# Patient Record
Sex: MALE | Race: OTHER | HISPANIC OR LATINO | URBAN - METROPOLITAN AREA
[De-identification: names, ages, dates, MRNs, and addresses within clinical notes are randomized per-mention and may not be internally consistent; named-entity substitution may affect disease eponyms.]

---

## 2019-01-02 ENCOUNTER — EMERGENCY (EMERGENCY)
Facility: HOSPITAL | Age: 64
LOS: 1 days | Discharge: HOME | End: 2019-01-02
Attending: EMERGENCY MEDICINE
Payer: SELF-PAY

## 2019-01-02 VITALS
HEART RATE: 99 BPM | OXYGEN SATURATION: 96 % | RESPIRATION RATE: 19 BRPM | WEIGHT: 189.6 LBS | TEMPERATURE: 99 F | HEIGHT: 68.11 IN | SYSTOLIC BLOOD PRESSURE: 141 MMHG | DIASTOLIC BLOOD PRESSURE: 95 MMHG

## 2019-01-02 PROCEDURE — 90715 TDAP VACCINE 7 YRS/> IM: CPT

## 2019-01-02 PROCEDURE — 99283 EMERGENCY DEPT VISIT LOW MDM: CPT | Mod: 25

## 2019-01-02 PROCEDURE — 90471 IMMUNIZATION ADMIN: CPT

## 2019-01-02 PROCEDURE — 12002 RPR S/N/AX/GEN/TRNK2.6-7.5CM: CPT | Mod: LT

## 2019-01-02 PROCEDURE — 12002 RPR S/N/AX/GEN/TRNK2.6-7.5CM: CPT

## 2019-01-02 PROCEDURE — 73562 X-RAY EXAM OF KNEE 3: CPT

## 2019-01-02 PROCEDURE — 73562 X-RAY EXAM OF KNEE 3: CPT | Mod: 26,LT

## 2019-01-02 RX ORDER — ACETAMINOPHEN 500 MG
975 TABLET ORAL ONCE
Qty: 0 | Refills: 0 | Status: COMPLETED | OUTPATIENT
Start: 2019-01-02 | End: 2019-01-02

## 2019-01-02 RX ORDER — TETANUS TOXOID, REDUCED DIPHTHERIA TOXOID AND ACELLULAR PERTUSSIS VACCINE, ADSORBED 5; 2.5; 8; 8; 2.5 [IU]/.5ML; [IU]/.5ML; UG/.5ML; UG/.5ML; UG/.5ML
0.5 SUSPENSION INTRAMUSCULAR ONCE
Qty: 0 | Refills: 0 | Status: COMPLETED | OUTPATIENT
Start: 2019-01-02 | End: 2019-01-02

## 2019-01-02 RX ADMIN — Medication 975 MILLIGRAM(S): at 11:50

## 2019-01-02 RX ADMIN — TETANUS TOXOID, REDUCED DIPHTHERIA TOXOID AND ACELLULAR PERTUSSIS VACCINE, ADSORBED 0.5 MILLILITER(S): 5; 2.5; 8; 8; 2.5 SUSPENSION INTRAMUSCULAR at 12:01

## 2019-01-02 NOTE — ED PROVIDER NOTE - MEDICAL DECISION MAKING DETAILS
Attending note-2 lacerations to the left lateral knee and one laceration to left wrist. X-rays rule out foreign body this patient was caught with shattered glass. Repair. Tetanus prophylaxis. Analgesia.

## 2019-01-02 NOTE — ED PROVIDER NOTE - ATTENDING CONTRIBUTION TO CARE
I performed a history and physical exam of the patient and discussed their management with the resident/ACP. I reviewed the resident/ACP's note and agree with the documented findings and plan of care.  attn  see MDM

## 2019-01-02 NOTE — ED PROVIDER NOTE - PHYSICAL EXAMINATION
Attending note. Patient is alert and in no acute distress. Patient has a 5 cm jagged laceration to the lateral aspect of the left knee and a 3 cm laceration in the lateral aspect of the left knee. Patient has a 1 cm laceration on the radial side of the left wrist. Distal pulses are intact. Sensation is intact. There are no visible foreign bodies.

## 2019-01-02 NOTE — ED PROVIDER NOTE - NSFOLLOWUPINSTRUCTIONS_ED_ALL_ED_FT
You were seen in the Emergency Department for lacerations. They were repaired. Keep the site dry for 24 hours. Please follow up with any physician in 7 days for suture removal. Apply bacitracin to the site daily. Please return to the Emergency Department if you have any new concerning symptoms such as severe pain, weakness, or any other concerning symptoms.

## 2019-01-02 NOTE — ED PROVIDER NOTE - PROGRESS NOTE DETAILS
Patient's wife who reports that the patient does not have any allergies. Patient is diabetes on insulin. Patient received tetanus immunization for years ago, however tetanus prophylaxis was given.

## 2019-01-02 NOTE — ED ADULT NURSE NOTE - OBJECTIVE STATEMENT
63 yr old male a/oX 3 c/o large glass window pane falling on him causing laceration to left knee above patella and lateral side of left thigh.  No head trauma or loc.  Normal ROm and neurovascular status

## 2019-01-02 NOTE — ED PROVIDER NOTE - CARE PLAN
Principal Discharge DX:	Laceration of left knee, initial encounter  Secondary Diagnosis:	Laceration of left wrist, initial encounter

## 2019-01-02 NOTE — ED PROVIDER NOTE - OBJECTIVE STATEMENT
Attending note. Patient was seen in the fast track room #4. Patient states last fell and shattered causing laceration to the left lateral knee and left wrist. Patient denies any numbness or paresthesia. Patient is not up-to-date with his tetanus.

## 2019-01-10 ENCOUNTER — EMERGENCY (EMERGENCY)
Facility: HOSPITAL | Age: 64
LOS: 1 days | Discharge: HOME | End: 2019-01-10
Attending: EMERGENCY MEDICINE
Payer: SELF-PAY

## 2019-01-10 VITALS
HEART RATE: 73 BPM | WEIGHT: 199.96 LBS | OXYGEN SATURATION: 98 % | RESPIRATION RATE: 16 BRPM | TEMPERATURE: 98 F | SYSTOLIC BLOOD PRESSURE: 128 MMHG | DIASTOLIC BLOOD PRESSURE: 81 MMHG

## 2019-01-10 DIAGNOSIS — S71.112A LACERATION WITHOUT FOREIGN BODY, LEFT THIGH, INITIAL ENCOUNTER: ICD-10-CM

## 2019-01-10 PROCEDURE — G0463: CPT

## 2019-01-10 NOTE — ED PROCEDURE NOTE - ATTENDING CONTRIBUTION TO CARE
I have participated in and supervised all key portions of the above procedures and agree with the above documentation. AJAY Moran MD

## 2019-01-10 NOTE — ED PROVIDER NOTE - OBJECTIVE STATEMENT
63M hx of DM presents to the ED for suture removal. Had a left distal thigh laceration repaired on 1/2/19. States the wound has been healing well. Denies any pus drainage, swelling, spreading redness, numbness, tingling, or weakness. Did received tetanus vaccination. Denies receiving any prophylactic abx.

## 2019-01-10 NOTE — ED PROVIDER NOTE - NSFOLLOWUPINSTRUCTIONS_ED_ALL_ED_FT
You were seen in the Emergency Department for removal of sutures (stitches) from your right knee.    Follow up with your primary care doctor within the next 2-3 days.  Return immediately if you have worsening pain, redness, swelling, pus draining from wound, fever or other new/concerning symptoms.

## 2019-01-10 NOTE — ED PROVIDER NOTE - ATTENDING CONTRIBUTION TO CARE
suture removal s/p laceration repair; 9 sutures removed from anterior left knee; no significant erythema, no palpable warmth/fluctuance over area.  Stable for dc after suture removal with return precautions for redness, swelling, pus draining from wound, fever or other new/concerning symptoms.

## 2019-01-10 NOTE — ED PROVIDER NOTE - MEDICAL DECISION MAKING DETAILS
Well-appearing suture repair. No signs of infection. Will remove sutures and have pt f/u with his PCP.

## 2019-01-10 NOTE — ED PROVIDER NOTE - NS ED ROS FT
GENERAL: No fever or chills  EYES: no change in vision  HEENT: no trouble swallowing or speaking  CARDIAC: no chest pain  PULMONARY: no cough or SOB  GI: no abdominal pain, no nausea, no vomiting, no diarrhea or constipation  : No changes in urination  SKIN: +L thigh laceration  NEURO: no headache or neuro sxs  MSK: No joint pain     ~Berhane Valdovinos PGY1

## 2019-01-10 NOTE — ED PROVIDER NOTE - PHYSICAL EXAMINATION
Gen: AAOx3, non-toxic  Head: NCAT  HEENT: EOMI, oral mucosa moist, normal conjunctiva  Lung: CTAB, no respiratory distress, no wheezes/rhonchi/rales B/L, speaking in full sentences  CV: RRR, no murmurs, rubs or gallops  Abd: soft, NTND, no guarding, no CVA tenderness  MSK: no visible deformities  Neuro: No focal sensory or motor deficits  Skin: +well healing L distal thigh laceration with 9 simple interrupted sutures. No signes of infection/fluid collection/cellulitis  Psych: normal affect.     ~Berhane Valdovinos PGY1

## 2019-01-16 ENCOUNTER — EMERGENCY (EMERGENCY)
Facility: HOSPITAL | Age: 64
LOS: 1 days | Discharge: HOME | End: 2019-01-16
Attending: EMERGENCY MEDICINE
Payer: SELF-PAY

## 2019-01-16 VITALS
WEIGHT: 199.96 LBS | RESPIRATION RATE: 18 BRPM | HEIGHT: 71 IN | SYSTOLIC BLOOD PRESSURE: 120 MMHG | DIASTOLIC BLOOD PRESSURE: 83 MMHG | HEART RATE: 71 BPM | OXYGEN SATURATION: 98 % | TEMPERATURE: 99 F

## 2019-01-16 VITALS
SYSTOLIC BLOOD PRESSURE: 115 MMHG | HEART RATE: 74 BPM | RESPIRATION RATE: 19 BRPM | OXYGEN SATURATION: 97 % | DIASTOLIC BLOOD PRESSURE: 71 MMHG

## 2019-01-16 PROBLEM — E11.9 TYPE 2 DIABETES MELLITUS WITHOUT COMPLICATIONS: Chronic | Status: ACTIVE | Noted: 2019-01-10

## 2019-01-16 LAB
ALBUMIN SERPL ELPH-MCNC: 4.2 G/DL — SIGNIFICANT CHANGE UP (ref 3.3–5)
ALP SERPL-CCNC: 82 U/L — SIGNIFICANT CHANGE UP (ref 40–120)
ALT FLD-CCNC: 38 U/L — SIGNIFICANT CHANGE UP (ref 10–45)
ANION GAP SERPL CALC-SCNC: 10 MMOL/L — SIGNIFICANT CHANGE UP (ref 5–17)
AST SERPL-CCNC: 25 U/L — SIGNIFICANT CHANGE UP (ref 10–40)
BASOPHILS # BLD AUTO: 0.1 K/UL — SIGNIFICANT CHANGE UP (ref 0–0.2)
BASOPHILS NFR BLD AUTO: 0.8 % — SIGNIFICANT CHANGE UP (ref 0–2)
BILIRUB SERPL-MCNC: 0.4 MG/DL — SIGNIFICANT CHANGE UP (ref 0.2–1.2)
BUN SERPL-MCNC: 13 MG/DL — SIGNIFICANT CHANGE UP (ref 7–23)
CALCIUM SERPL-MCNC: 9.3 MG/DL — SIGNIFICANT CHANGE UP (ref 8.4–10.5)
CHLORIDE SERPL-SCNC: 100 MMOL/L — SIGNIFICANT CHANGE UP (ref 96–108)
CO2 SERPL-SCNC: 25 MMOL/L — SIGNIFICANT CHANGE UP (ref 22–31)
CREAT SERPL-MCNC: 0.58 MG/DL — SIGNIFICANT CHANGE UP (ref 0.5–1.3)
EOSINOPHIL # BLD AUTO: 0.3 K/UL — SIGNIFICANT CHANGE UP (ref 0–0.5)
EOSINOPHIL NFR BLD AUTO: 4.5 % — SIGNIFICANT CHANGE UP (ref 0–6)
GLUCOSE SERPL-MCNC: 235 MG/DL — HIGH (ref 70–99)
HCT VFR BLD CALC: 41.3 % — SIGNIFICANT CHANGE UP (ref 39–50)
HGB BLD-MCNC: 14.6 G/DL — SIGNIFICANT CHANGE UP (ref 13–17)
LYMPHOCYTES # BLD AUTO: 2.3 K/UL — SIGNIFICANT CHANGE UP (ref 1–3.3)
LYMPHOCYTES # BLD AUTO: 29.5 % — SIGNIFICANT CHANGE UP (ref 13–44)
MCHC RBC-ENTMCNC: 31.5 PG — SIGNIFICANT CHANGE UP (ref 27–34)
MCHC RBC-ENTMCNC: 35.4 GM/DL — SIGNIFICANT CHANGE UP (ref 32–36)
MCV RBC AUTO: 89.1 FL — SIGNIFICANT CHANGE UP (ref 80–100)
MONOCYTES # BLD AUTO: 0.6 K/UL — SIGNIFICANT CHANGE UP (ref 0–0.9)
MONOCYTES NFR BLD AUTO: 7.2 % — SIGNIFICANT CHANGE UP (ref 2–14)
NEUTROPHILS # BLD AUTO: 4.5 K/UL — SIGNIFICANT CHANGE UP (ref 1.8–7.4)
NEUTROPHILS NFR BLD AUTO: 58.1 % — SIGNIFICANT CHANGE UP (ref 43–77)
PLATELET # BLD AUTO: 280 K/UL — SIGNIFICANT CHANGE UP (ref 150–400)
POTASSIUM SERPL-MCNC: 3.7 MMOL/L — SIGNIFICANT CHANGE UP (ref 3.5–5.3)
POTASSIUM SERPL-SCNC: 3.7 MMOL/L — SIGNIFICANT CHANGE UP (ref 3.5–5.3)
PROT SERPL-MCNC: 7.6 G/DL — SIGNIFICANT CHANGE UP (ref 6–8.3)
RBC # BLD: 4.64 M/UL — SIGNIFICANT CHANGE UP (ref 4.2–5.8)
RBC # FLD: 11.7 % — SIGNIFICANT CHANGE UP (ref 10.3–14.5)
SODIUM SERPL-SCNC: 135 MMOL/L — SIGNIFICANT CHANGE UP (ref 135–145)
WBC # BLD: 7.8 K/UL — SIGNIFICANT CHANGE UP (ref 3.8–10.5)
WBC # FLD AUTO: 7.8 K/UL — SIGNIFICANT CHANGE UP (ref 3.8–10.5)

## 2019-01-16 PROCEDURE — 76536 US EXAM OF HEAD AND NECK: CPT

## 2019-01-16 PROCEDURE — 80053 COMPREHEN METABOLIC PANEL: CPT

## 2019-01-16 PROCEDURE — 99284 EMERGENCY DEPT VISIT MOD MDM: CPT

## 2019-01-16 PROCEDURE — 87070 CULTURE OTHR SPECIMN AEROBIC: CPT

## 2019-01-16 PROCEDURE — 85027 COMPLETE CBC AUTOMATED: CPT

## 2019-01-16 RX ORDER — CEPHALEXIN 500 MG
1 CAPSULE ORAL
Qty: 10 | Refills: 0 | OUTPATIENT
Start: 2019-01-16 | End: 2019-01-20

## 2019-01-16 NOTE — ED ADULT NURSE NOTE - OBJECTIVE STATEMENT
64yo Male a/ox3 ambulatory with PMH of DM presenting to ed with c/o drainage from left knee/thigh wound. Pt mostly Afghan speaking, prefers wife at bedside to assist with translation. Pt seen in [facility] ED 1/2/19 after having a fall on glass with L distal thigh laceration, repaired with sutures. Xray negative for FB at time, and tdap given. Pt returned for suture removal and wound check on 1/10/19, all WNL. Pt reports persistent watery, clear/white drainage from wound since. Did not take any antibiotics. Reports knee pain has been the same since fall. Denies any fever/chills, n/v, purulent drainage, spreading redness, increase in knee pain/swelling.

## 2019-01-16 NOTE — ED PROVIDER NOTE - PLAN OF CARE
Stay hydrated.   Take keflex 500mg twice daily for 5 days.  Take Tylenol 650mg every 6 hours as needed for pain.   Please follow up with your Primary Care Provider upon discharge as instructed.   Bring copy of results with you.   Return to ER for any new fever/chills, nausea, vomiting, worsening knee or wound pain/swelling/redness, purulent drainage, or any other concerns.

## 2019-01-16 NOTE — ED PROVIDER NOTE - ATTENDING CONTRIBUTION TO CARE
Patient is a 62 yo M with history of DM s/p fall on 1/2/19, injuring his left distal thigh, s/p suture placement, tetanus update, suture removal - now here for evaluation for clear drainage from wound. Sutures were removed 10th. No fevers, nausea, vomiting. He states pain has been the same since the fall. He is ambulating.     VS noted  Gen. no acute distress, Non toxic   HEENT: EOMI, mmm  Lungs: CTAB/L no C/ W /R   CVS: RRR   Abd; Soft non tender, non distended   Ext:   Skin: no rash  Neuro AAOx3 non focal clear speech  a/p:  left leg wound  - Carlos MITCHELL Patient is a 64 yo M with history of DM s/p fall on 1/2/19, injuring his left distal thigh, s/p suture placement, tetanus update, suture removal - now here for evaluation for clear drainage from wound. Sutures were removed 10th. No fevers, nausea, vomiting. He states pain has been the same since the fall. He is ambulating.     VS noted  Gen. no acute distress, Non toxic   HEENT: EOMI, mmm  Lungs: CTAB/L no C/ W /R   CVS: RRR   Abd; Soft non tender, non distended   Ext: left distal femur with healing wound, + min serosanguinous drainage, minimal erythema, no warmth to touch, no tenderness or crepitus around wound site  Skin: no rash  Neuro AAOx3 non focal clear speech  a/p:  left leg wound with mild serosanguinous drainage - will do ED US to look for collection/ drainable abscess and likely d/c with antibiotics and instructions for close follow up. Return for any fever, increasing pain, worsening drainage/ pus/ increased redness or warmth to the site.   - Carlos MITCHELL

## 2019-01-16 NOTE — ED PROVIDER NOTE - NSFOLLOWUPINSTRUCTIONS_ED_ALL_ED_FT
Stay hydrated.   Take antibiotic, keflex 500mg, twice daily for 5 days.  Take Tylenol 650mg every 6 hours as needed for pain.   Please follow up with your Primary Care Provider upon discharge as instructed.   Bring copy of results with you.   Return to ER for any new fever/chills, nausea, vomiting, worsening knee or wound pain/swelling/redness, purulent drainage, or any other concerns.

## 2019-01-16 NOTE — ED ADULT NURSE NOTE - NSIMPLEMENTINTERV_GEN_ALL_ED
Implemented All Universal Safety Interventions:  [place] to call system. Call bell, personal items and telephone within reach. Instruct patient to call for assistance. Room bathroom lighting operational. Non-slip footwear when patient is off stretcher. Physically safe environment: no spills, clutter or unnecessary equipment. Stretcher in lowest position, wheels locked, appropriate side rails in place.

## 2019-01-16 NOTE — ED PROVIDER NOTE - PROGRESS NOTE DETAILS
Scant fluid collection noted on US (0.25cm) that was extracted with minimal pressure, again + clear drainage. Pt's wife concerned for infection given pt is a diabetic, though pt is afebrile with normal WBC and nonpurulent drainage with NO surrounding cellulitis. Will send rx for keflex and instructed patient to f/u with PCP. D/w Dr. Gonzalez. - RANJIT NazarioC

## 2019-01-16 NOTE — ED PROVIDER NOTE - LOCATION
+ 4cm healing wound to lateral distal L thigh/superior knee with NO obvious dehiscence, + minimal clear drainage, + mild erythema along wound with no spreading redness/streaking, no warmth, no fluctuance noted, full ROM of knee intact

## 2019-01-16 NOTE — ED PROVIDER NOTE - CARE PLAN
Principal Discharge DX:	Wound of lower extremity, subsequent encounter  Assessment and plan of treatment:	Stay hydrated.   Take keflex 500mg twice daily for 5 days.  Take Tylenol 650mg every 6 hours as needed for pain.   Please follow up with your Primary Care Provider upon discharge as instructed.   Bring copy of results with you.   Return to ER for any new fever/chills, nausea, vomiting, worsening knee or wound pain/swelling/redness, purulent drainage, or any other concerns.

## 2019-01-16 NOTE — ED ADULT TRIAGE NOTE - CHIEF COMPLAINT QUOTE
sustained wound 1/2/19 seen in [facility] ED stitches placed, seen again 1/10/19 stitches removed now c/o "drainage" to site - drainage clear/bloody

## 2019-01-16 NOTE — ED ADULT NURSE NOTE - CHIEF COMPLAINT QUOTE
sustained wound 1/2/19 seen in [facility] ED stitches placed, seen again 1/10/19 stitches removed now c/o "drainage" to site - drainage clear/bloody

## 2019-01-16 NOTE — ED PROVIDER NOTE - OBJECTIVE STATEMENT
64yo M with PMH DM c/o drainage from left knee/thigh wound. Pt mostly Setswana speaking, prefers wife at bedside to assist with translation. Pt seen in [facility] ED 1/2/19 for fall on glass with L distal thigh laceration, repaired with sutures. Pt returned for suture removal and wound check on 1/10/19, all WNL. Pt reports persistent watery, clear/white drainage from wound since. Did not take any antibiotics. Reports knee pain has been the same since fall. Denies any fever/chills, n/v, purulent drainage, spreading redness, increase in knee pain/swelling. 62yo M with PMH DM c/o drainage from left knee/thigh wound. Pt mostly Latvian speaking, prefers wife at bedside to assist with translation. Pt seen in [facility] ED 1/2/19 for fall on glass with L distal thigh laceration, repaired with sutures. Xray negative for FB at time, and tdap given. Pt returned for suture removal and wound check on 1/10/19, all WNL. Pt reports persistent watery, clear/white drainage from wound since. Did not take any antibiotics. Reports knee pain has been the same since fall. Denies any fever/chills, n/v, purulent drainage, spreading redness, increase in knee pain/swelling. 62yo M with PMH DM c/o drainage from left knee/thigh wound. Pt mostly Mongolian speaking, prefers wife at bedside to assist with translation. Pt seen in [facility] ED 1/2/19 for fall on glass with L distal thigh laceration, repaired with sutures. Xray negative for FB at time, and tdap given. Pt returned for suture removal and wound check on 1/10/19, all WNL. Pt reports persistent watery, clear/white drainage from wound since. Did not take any antibiotics. Reports knee pain has been the same since fall. Denies any fever/chills, n/v, purulent drainage, spreading redness, increase in knee pain/swelling, numbness/tingling. 64yo M with PMH DM c/o drainage from left knee/thigh wound. Pt mostly Lao speaking, prefers wife at bedside to assist with translation. Pt seen in [facility] ED 1/2/19 for fall on glass with L distal thigh laceration, repaired with sutures. Xray negative for FB at time, and tdap given. Pt returned for suture removal and wound check on 1/10/19, all WNL. Pt reports persistent watery, clear/white drainage from wound since. Did not take any antibiotics. Reports knee pain has been the same since fall. Able to ambulate without trouble. Denies any fever/chills, n/v, purulent drainage, spreading redness, increase in knee pain/swelling, numbness/tingling.

## 2019-01-17 PROCEDURE — 76536 US EXAM OF HEAD AND NECK: CPT | Mod: 26

## 2019-01-18 LAB
CULTURE RESULTS: SIGNIFICANT CHANGE UP
SPECIMEN SOURCE: SIGNIFICANT CHANGE UP

## 2019-06-03 NOTE — ED PROVIDER NOTE - CARDIAC, MLM
pressures at home. Return in about 3 weeks (around 6/10/2019) for wellness. Reviewed with the patient: current clinical status, medications, activities and diet. Side effects, adverse effects of the medication prescribed today, as well as treatment plan/ rationale and result expectations have beendiscussed with the patient who expresses understanding and desires to proceed. Close follow up to evaluate treatment results and for coordinationof care. I have reviewed the patient's medical history in detail and updated the computerized patient record.     TANGELA Jessica - CNP Normal rate, regular rhythm.  Heart sounds S1, S2.  No murmurs, rubs or gallops.

## 2023-10-20 ENCOUNTER — EMERGENCY (EMERGENCY)
Facility: HOSPITAL | Age: 68
LOS: 1 days | Discharge: HOME | End: 2023-10-20
Attending: EMERGENCY MEDICINE
Payer: COMMERCIAL

## 2023-10-20 VITALS
HEART RATE: 61 BPM | TEMPERATURE: 98 F | OXYGEN SATURATION: 97 % | DIASTOLIC BLOOD PRESSURE: 80 MMHG | SYSTOLIC BLOOD PRESSURE: 149 MMHG | RESPIRATION RATE: 18 BRPM

## 2023-10-20 VITALS
SYSTOLIC BLOOD PRESSURE: 148 MMHG | RESPIRATION RATE: 20 BRPM | DIASTOLIC BLOOD PRESSURE: 77 MMHG | WEIGHT: 211.86 LBS | HEIGHT: 71 IN | OXYGEN SATURATION: 99 % | TEMPERATURE: 98 F | HEART RATE: 66 BPM

## 2023-10-20 LAB
ALBUMIN SERPL ELPH-MCNC: 4.5 G/DL — SIGNIFICANT CHANGE UP (ref 3.5–5)
ALP SERPL-CCNC: 99 U/L — SIGNIFICANT CHANGE UP (ref 40–120)
ALT FLD-CCNC: 26 U/L DA — SIGNIFICANT CHANGE UP (ref 10–60)
ANION GAP SERPL CALC-SCNC: 8 MMOL/L — SIGNIFICANT CHANGE UP (ref 5–17)
AST SERPL-CCNC: 16 U/L — SIGNIFICANT CHANGE UP (ref 10–40)
BILIRUB SERPL-MCNC: 0.4 MG/DL — SIGNIFICANT CHANGE UP (ref 0.2–1.2)
BUN SERPL-MCNC: 23 MG/DL — HIGH (ref 7–18)
CALCIUM SERPL-MCNC: 9.7 MG/DL — SIGNIFICANT CHANGE UP (ref 8.4–10.5)
CHLORIDE SERPL-SCNC: 105 MMOL/L — SIGNIFICANT CHANGE UP (ref 96–108)
CO2 SERPL-SCNC: 26 MMOL/L — SIGNIFICANT CHANGE UP (ref 22–31)
CREAT SERPL-MCNC: 1.22 MG/DL — SIGNIFICANT CHANGE UP (ref 0.5–1.3)
CRP SERPL-MCNC: 12 MG/L — HIGH
EGFR: 64 ML/MIN/1.73M2 — SIGNIFICANT CHANGE UP
ERYTHROCYTE [SEDIMENTATION RATE] IN BLOOD: 18 MM/HR — SIGNIFICANT CHANGE UP (ref 0–20)
GLUCOSE SERPL-MCNC: 79 MG/DL — SIGNIFICANT CHANGE UP (ref 70–99)
HCT VFR BLD CALC: 38.2 % — LOW (ref 39–50)
HGB BLD-MCNC: 12.4 G/DL — LOW (ref 13–17)
MCHC RBC-ENTMCNC: 30.4 PG — SIGNIFICANT CHANGE UP (ref 27–34)
MCHC RBC-ENTMCNC: 32.5 GM/DL — SIGNIFICANT CHANGE UP (ref 32–36)
MCV RBC AUTO: 93.6 FL — SIGNIFICANT CHANGE UP (ref 80–100)
NRBC # BLD: 0 /100 WBCS — SIGNIFICANT CHANGE UP (ref 0–0)
PLATELET # BLD AUTO: 251 K/UL — SIGNIFICANT CHANGE UP (ref 150–400)
POTASSIUM SERPL-MCNC: 4 MMOL/L — SIGNIFICANT CHANGE UP (ref 3.5–5.3)
POTASSIUM SERPL-SCNC: 4 MMOL/L — SIGNIFICANT CHANGE UP (ref 3.5–5.3)
PROT SERPL-MCNC: 10 G/DL — HIGH (ref 6–8.3)
RBC # BLD: 4.08 M/UL — LOW (ref 4.2–5.8)
RBC # FLD: 13.4 % — SIGNIFICANT CHANGE UP (ref 10.3–14.5)
SODIUM SERPL-SCNC: 139 MMOL/L — SIGNIFICANT CHANGE UP (ref 135–145)
WBC # BLD: 7.58 K/UL — SIGNIFICANT CHANGE UP (ref 3.8–10.5)
WBC # FLD AUTO: 7.58 K/UL — SIGNIFICANT CHANGE UP (ref 3.8–10.5)

## 2023-10-20 PROCEDURE — 87077 CULTURE AEROBIC IDENTIFY: CPT

## 2023-10-20 PROCEDURE — 73620 X-RAY EXAM OF FOOT: CPT

## 2023-10-20 PROCEDURE — 99284 EMERGENCY DEPT VISIT MOD MDM: CPT | Mod: 25

## 2023-10-20 PROCEDURE — 87070 CULTURE OTHR SPECIMN AEROBIC: CPT

## 2023-10-20 PROCEDURE — 83036 HEMOGLOBIN GLYCOSYLATED A1C: CPT

## 2023-10-20 PROCEDURE — 86140 C-REACTIVE PROTEIN: CPT

## 2023-10-20 PROCEDURE — 85652 RBC SED RATE AUTOMATED: CPT

## 2023-10-20 PROCEDURE — 36415 COLL VENOUS BLD VENIPUNCTURE: CPT

## 2023-10-20 PROCEDURE — 80053 COMPREHEN METABOLIC PANEL: CPT

## 2023-10-20 PROCEDURE — 87186 SC STD MICRODIL/AGAR DIL: CPT

## 2023-10-20 PROCEDURE — 10160 PNXR ASPIR ABSC HMTMA BULLA: CPT

## 2023-10-20 PROCEDURE — 73620 X-RAY EXAM OF FOOT: CPT | Mod: 26,RT

## 2023-10-20 PROCEDURE — 85027 COMPLETE CBC AUTOMATED: CPT

## 2023-10-20 PROCEDURE — 87040 BLOOD CULTURE FOR BACTERIA: CPT

## 2023-10-20 PROCEDURE — 99285 EMERGENCY DEPT VISIT HI MDM: CPT

## 2023-10-20 RX ORDER — CEPHALEXIN 500 MG
500 CAPSULE ORAL ONCE
Refills: 0 | Status: COMPLETED | OUTPATIENT
Start: 2023-10-20 | End: 2023-10-20

## 2023-10-20 RX ORDER — CEPHALEXIN 500 MG
1 CAPSULE ORAL
Qty: 14 | Refills: 0
Start: 2023-10-20 | End: 2023-10-26

## 2023-10-20 RX ADMIN — Medication 1 TABLET(S): at 15:03

## 2023-10-20 RX ADMIN — Medication 500 MILLIGRAM(S): at 15:03

## 2023-10-20 NOTE — ED PROVIDER NOTE - PATIENT PORTAL LINK FT
You can access the FollowMyHealth Patient Portal offered by [facility] by registering at the following website: [email or web address]. By joining [facility]’s FollowMyHealth portal, you will also be able to view your health information using other applications (apps) compatible with our system. You can access the FollowMyHealth Patient Portal offered by [facility] by registering at the following website: [email or web address]. By joining [facility]’s FollowMyHealth portal, you will also be able to view your health information using other applications (apps) compatible with our system. You can access the FollowMyHealth Patient Portal offered by [facility] by registering at the following website: [email or web address]. By joining [facility]’s FollowMyHealth portal, you will also be able to view your health information using other applications (apps) compatible with our system.

## 2023-10-20 NOTE — ED PROVIDER NOTE - PHYSICAL EXAMINATION
right foot sweollen warm to touch  swells to prox calf  pllantar surface with wound appears pale, white, non blanching near distal foot btw 2/3 digit area

## 2023-10-20 NOTE — CONSULT NOTE ADULT - ASSESSMENT
Patient to follow up at our patient clinic upon discharge to [address] [place] [address], [address], [place] [address]. Call [phone] to make an appointment.   Recommend PO abx      Unfinished now Patient to follow up at our patient clinic upon discharge to [address] [place] [address], [address], [place] [address]. Call [phone] to make an appointment.   Recommend PO abx      Unfinished now Patient to follow up at our patient clinic upon discharge to [address] [place] [address], [address], [place] [address]. Call [phone] to make an appointment.   Recommend PO abx      Unfinished now A:  Right foot plantar abscess        P:  Pt evaluated and chart reviewed  Vitals reviewed, no leukocytosis  Reviewed right foot xrays- pending final read  Cleansed right foot with alcohol pads, injected 5 cc's of 1% lidocaine to area of abscess. preformed incision with 18 gauge needle to 2nd digit sulcus of foot. Expressed 2 cc of purulent drainage. Obtained wound culture. Copiously flushed with betadine infused sterile saline. Applied betadine DSD, Ace compression and dispensed surgical shoe.   Patient to wb as tolerated  Reviewed with patient proper wound care and wound inspection period. Instructed patient on the importance of adherence to the treatment plan of wearing the offloading shoes at all time, as well as appropriate wound care as discussed in this appointment.  Review potential complications of non-adherence to the described plan, including, but not limited to, worsening of ulcer, infection, hospitalization, amputation.  The patient is alerted to watch for any signs of infection (Redness, pus, pain, increased swelling or fever) and to call Office if such occurs or proceed to the emergency room.  Patient gave verbal understanding to all information given.  Rx; PO Augmentin 875mg BID x10 days  Patient to follow up at our patient clinic upon discharge to [address] [place] [address], [address], [place] [address]. Call [phone] to make an appointment.   Discussed with Dr. Jimenez   A:  Right foot plantar abscess        P:  Pt evaluated and chart reviewed  Vitals reviewed, no leukocytosis  Reviewed right foot xrays- pending final read  Cleansed right foot with alcohol pads, injected 5 cc's of 1% lidocaine to area of abscess. preformed incision with 18 gauge needle to 2nd digit sulcus of foot. Expressed 2 cc of purulent drainage. Obtained wound culture. Copiously flushed with betadine infused sterile saline. Applied betadine DSD, Ace compression and dispensed surgical shoe.   Patient to wb as tolerated  Reviewed with patient proper wound care and wound inspection period. Instructed patient on the importance of adherence to the treatment plan of wearing the offloading shoes at all time, as well as appropriate wound care as discussed in this appointment.  Review potential complications of non-adherence to the described plan, including, but not limited to, worsening of ulcer, infection, hospitalization, amputation.  The patient is alerted to watch for any signs of infection (Redness, pus, pain, increased swelling or fever) and to call Office if such occurs or proceed to the emergency room.  Patient gave verbal understanding to all information given.  Rx; PO Augmentin 875mg BID x10 days  Patient to follow up at our patient clinic upon discharge to [address] [place] [address], [address], [place] [address]. Call [phone] to make an appointment.   Discussed with Dr. Jimenez   A:  Right foot plantar abscess        P:  Pt evaluated and chart reviewed  Vitals reviewed, no leukocytosis  Reviewed right foot xrays- pending final read  Cleansed right foot with alcohol pads, injected 5 cc's of 1% lidocaine to area of abscess. preformed incision with 18 gauge needle to 2nd digit sulcus of foot. Expressed 2 cc of purulent drainage. Obtained wound culture. Copiously flushed with betadine infused sterile saline. Applied betadine DSD, Ace compression and dispensed surgical shoe.   Patient to wb as tolerated  Reviewed with patient proper wound care and wound inspection period. Instructed patient on the importance of adherence to the treatment plan of wearing the offloading shoes at all time, as well as appropriate wound care as discussed in this appointment.  Review potential complications of non-adherence to the described plan, including, but not limited to, worsening of ulcer, infection, hospitalization, amputation.  The patient is alerted to watch for any signs of infection (Redness, pus, pain, increased swelling or fever) and to call Office if such occurs or proceed to the emergency room.  Patient gave verbal understanding to all information given.  Rx; PO Augmentin 875mg BID x10 days  Patient to follow up at our patient clinic upon discharge to [address] [place] [address], [address], [place] [address]. Call [phone] to make an appointment.   Discussed with Dr. Jimenez   A:  Right foot plantar abscess        P:  Pt evaluated and chart reviewed  Vitals reviewed, no leukocytosis  Reviewed right foot xrays- pending final read  Cleansed right foot with alcohol pads, injected 5 cc's of 1% lidocaine to area of abscess. preformed incision with 18 gauge needle to 2nd digit sulcus of foot. Expressed 2 cc of purulent drainage. Obtained wound culture. Copiously flushed with betadine infused sterile saline. Applied betadine DSD, Ace compression and dispensed surgical shoe.   Patient to wb as tolerated  Reviewed with patient proper wound care and wound inspection period. Instructed patient on the importance of adherence to the treatment plan of wearing the offloading shoes at all time, as well as appropriate wound care as discussed in this appointment.  Review potential complications of non-adherence to the described plan, including, but not limited to, worsening of ulcer, infection, hospitalization, amputation.  The patient is alerted to watch for any signs of infection (Redness, pus, pain, increased swelling or fever) and to call Office if such occurs or proceed to the emergency room.  Patient gave verbal understanding to all information given.  Recommend PO Abx  Patient to follow up at our patient clinic upon discharge to [address] [place] [address], [address], [place] [address]. Call [phone] to make an appointment.   Discussed with Dr. Jimenez   A:  Right foot plantar abscess        P:  Pt evaluated and chart reviewed  Vitals reviewed, no leukocytosis  Reviewed right foot xrays- pending final read  Cleansed right foot with alcohol pads, injected 5 cc's of 1% lidocaine to area of abscess. preformed incision with 18 gauge needle to 2nd digit sulcus of foot. Expressed 2 cc of purulent drainage. Obtained wound culture. Copiously flushed with betadine infused sterile saline. Applied betadine DSD, Ace compression and dispensed surgical shoe.   Patient to wb as tolerated  Reviewed with patient proper wound care and wound inspection period. Instructed patient on the importance of adherence to the treatment plan of wearing the offloading shoes at all time, as well as appropriate wound care as discussed in this appointment.  Review potential complications of non-adherence to the described plan, including, but not limited to, worsening of ulcer, infection, hospitalization, amputation.  The patient is alerted to watch for any signs of infection (Redness, pus, pain, increased swelling or fever) and to call Office if such occurs or proceed to the emergency room.  Patient gave verbal understanding to all information given.  Recommend PO Abx  Patient to follow up at our patient clinic upon discharge to [address] [place] [address], [address], [place] [address]. Call [phone] to make an appointment.   Discussed with Dr. Jimenez   A:  Right foot plantar abscess        P:  Pt evaluated and chart reviewed  Vitals reviewed, no leukocytosis  Reviewed right foot xrays- pending final read  Cleansed right foot with alcohol pads, injected 5 cc's of 1% lidocaine to area of abscess. preformed incision with 18 gauge needle to 2nd digit sulcus of foot. Expressed 2 cc of purulent drainage. Obtained wound culture. Copiously flushed with betadine infused sterile saline. Applied betadine DSD, Ace compression and dispensed surgical shoe.   Patient to wb as tolerated  Reviewed with patient proper wound care and wound inspection period. Instructed patient on the importance of adherence to the treatment plan of wearing the offloading shoes at all time, as well as appropriate wound care as discussed in this appointment.  Review potential complications of non-adherence to the described plan, including, but not limited to, worsening of ulcer, infection, hospitalization, amputation.  The patient is alerted to watch for any signs of infection (Redness, pus, pain, increased swelling or fever) and to call Office if such occurs or proceed to the emergency room.  Patient gave verbal understanding to all information given.  Recommend PO Abx  Patient to follow up at our patient clinic upon discharge to [address] [place] [address], [address], [place] [address]. Call [phone] to make an appointment.   Discussed with Dr. Jimenez

## 2023-10-20 NOTE — ED PROVIDER NOTE - OBJECTIVE STATEMENT
General
68-year-old male presents with wife for foot injury she provides most the history declines official    he is diabetic and has had foot problems in the past specially other foot   she notes he had foot pain for about a week was limping a little bit it is a bit swollen   2 days ago she noted a small doctor to the foot on the plantar surface between the second third digit   today she noticed it was much larger and more blanching like   more painful no fevers or chills no injury or trauma non-smoker nondrinker nondrug user brings him in for evaluation

## 2023-10-20 NOTE — ED ADULT NURSE NOTE - ED STAT RN HANDOFF DETAILS
Pt discharged from Ed in stable condition. discharge instructions and new medications discussed. Pt verbalized understanding. all questions answered.

## 2023-10-20 NOTE — ED PROVIDER NOTE - NSFOLLOWUPINSTRUCTIONS_ED_ALL_ED_FT
Abscess    WHAT YOU NEED TO KNOW:    A warm compress may help your abscess drain. Your healthcare provider may make a cut in the abscess so it can drain. You may need surgery to remove an abscess that is on your hands or buttocks.  Skin Abscess    DISCHARGE INSTRUCTIONS:    Return to the emergency department if:    The area around your abscess becomes very painful, warm, or has red streaks.    You have a fever and chills.    Your heart is beating faster than usual.    You feel faint or confused.  Call your doctor if:    Your abscess gets bigger or does not get better.    Your abscess returns.    You have questions or concerns about your condition or care.  Medicines: You may need any of the following:    Antibiotics help treat a bacterial infection.    Acetaminophen decreases pain and fever. It is available without a doctor's order. Ask how much to take and how often to take it. Follow directions. Read the labels of all other medicines you are using to see if they also contain acetaminophen, or ask your doctor or pharmacist. Acetaminophen can cause liver damage if not taken correctly.    NSAIDs, such as ibuprofen, help decrease swelling, pain, and fever. This medicine is available with or without a doctor's order. NSAIDs can cause stomach bleeding or kidney problems in certain people. If you take blood thinner medicine, always ask your healthcare provider if NSAIDs are safe for you. Always read the medicine label and follow directions.    Take your medicine as directed. Contact your healthcare provider if you think your medicine is not helping or if you have side effects. Tell your provider if you are allergic to any medicine. Keep a list of the medicines, vitamins, and herbs you take. Include the amounts, and when and why you take them. Bring the list or the pill bottles to follow-up visits. Carry your medicine list with you in case of an emergency.  Self-care:    Apply a warm compress to your abscess. This will help it open and drain. Wet a washcloth in warm, but not hot, water. Apply the compress for 10 minutes. Repeat this 4 times each day. Do not press on an abscess or try to open it with a needle. You may push the bacteria deeper or into your blood.    Do not share your clothes, towels, or sheets with anyone. This can spread the infection to others.    Wash your hands often. This can help prevent the spread of germs. Use soap and water or an alcohol-based hand rub.  Handwashing  Care for your wound after it is drained:    Care for your wound as directed. If your healthcare provider says it is okay, carefully remove the bandage and gauze packing. You may need to soak the gauze to get it out of your wound. Clean your wound and the area around it as directed. Dry the area and put on new, clean bandages. Change your bandages when they get wet or dirty.    Ask your healthcare provider how to change the gauze in your wound. Keep track of how many pieces of gauze are placed inside the wound. Do not put too much packing in the wound. Do not pack the gauze too tightly in your wound.  Follow up with your healthcare provider in 1 to 3 days: You may need to have your packing removed or your bandage changed. Write down your questions so you remember to ask them during your visits.      Foot Care for People with Diabetes    WHAT YOU NEED TO KNOW:    Long-term high blood sugar levels can damage the blood vessels and nerves in your legs and feet. This damage makes it hard to feel pressure, pain, temperature, and touch. You may not be able to feel a cut or sore, or shoes that are too tight. Foot care is needed to prevent serious problems, such as an infection or amputation. Diabetes may cause your toes to become crooked or curved under. These changes may affect the way you walk and can lead to increased pressure on your foot. The pressure can decrease blood flow to your feet. Lack of blood flow increases your risk for a foot ulcer.    DISCHARGE INSTRUCTIONS:    Call your care team provider if:    Your feet become numb, weak, or hard to move.    You have pus draining from a sore on your foot.    You have a wound on your foot that gets bigger, deeper, or does not heal.    You see blisters, cuts, scratches, calluses, or sores on your foot.    You have a fever, and your feet become red, warm, and swollen.    Your toenails become thick, curled, or yellow.    You find it hard to check your feet because your vision is poor.    You have questions or concerns about your condition or care.  Foot care:    Check your feet each day. Look at your whole foot, including the bottom, and between and under your toes. Check for wounds, corns, and calluses. Use a mirror to see the bottom of your feet. The skin on your feet may be shiny, tight, or darker than normal. Your feet may also be cold and pale. Feel your feet by running your hands along the tops, bottoms, sides, and between your toes. Redness, swelling, and warmth are signs of blood flow problems that can lead to a foot ulcer. Do not try to remove corns or calluses yourself. Do not ignore small problems, such as dry skin or small wounds. These can become life-threatening over time without proper care.  Diabetic  Foot Care      Wash your feet each day with soap and warm water. Do not use hot water, because this can injure your foot. Dry your feet gently with a towel after you wash them. Dry between and under your toes.    Apply lotion or a moisturizer on your dry feet. Ask your care team provider what lotions are best to use. Do not put lotion or moisturizer between your toes. Moisture between your toes could lead to skin breakdown.    Cut your toenails correctly. File or cut your toenails straight across. Use a soft brush to clean around your toenails. If your toenails are very thick, you may need to have a care team provider or specialist cut them.    Protect your feet. Do not walk barefoot or wear your shoes without socks. Check your shoes for rocks or other objects that can hurt your feet. Wear cotton socks to help keep your feet dry. Wear socks without toe seams, or wear them with the seams inside out. Change your socks each day. Do not wear socks that are dirty or damp.    Wear shoes that fit well. Wear shoes that do not rub against any area of your feet. Your shoes should be ½ to ¾ inch (1 to 2 centimeters) longer than your feet. Your shoes should also have extra space around the widest part of your feet. Walking or athletic shoes with laces or straps that adjust are best. Ask your care team provider for help to choose shoes that fit you best. Ask your provider if you need to wear an insert, orthotic, or bandage on your feet.  Properly Fitting Shoes      Do not smoke. Smoking can damage your blood vessels and put you at increased risk for foot ulcers. Ask your care team provider for information if you currently smoke and need help to quit. E-cigarettes or smokeless tobacco still contain nicotine. Talk to your care team provider before you use these products.  Follow up with your diabetes care team provider or foot specialist as directed: You will need to have your feet checked at least 1 time each year. You may need a foot exam more often if you have nerve damage, foot deformities, or ulcers. Write down your questions so you remember to ask them during your visits. follow up in clinic upon discharge to   [address] [facility], [address], [place] [address].  Call [phone] to make an appointment.   Discussed with Dr. Jimenez  antibiotics as directed  return if anything hcanges or worsens      Abscess    WHAT YOU NEED TO KNOW:    A warm compress may help your abscess drain. Your healthcare provider may make a cut in the abscess so it can drain. You may need surgery to remove an abscess that is on your hands or buttocks.  Skin Abscess    DISCHARGE INSTRUCTIONS:    Return to the emergency department if:    The area around your abscess becomes very painful, warm, or has red streaks.    You have a fever and chills.    Your heart is beating faster than usual.    You feel faint or confused.  Call your doctor if:    Your abscess gets bigger or does not get better.    Your abscess returns.    You have questions or concerns about your condition or care.  Medicines: You may need any of the following:    Antibiotics help treat a bacterial infection.    Acetaminophen decreases pain and fever. It is available without a doctor's order. Ask how much to take and how often to take it. Follow directions. Read the labels of all other medicines you are using to see if they also contain acetaminophen, or ask your doctor or pharmacist. Acetaminophen can cause liver damage if not taken correctly.    NSAIDs, such as ibuprofen, help decrease swelling, pain, and fever. This medicine is available with or without a doctor's order. NSAIDs can cause stomach bleeding or kidney problems in certain people. If you take blood thinner medicine, always ask your healthcare provider if NSAIDs are safe for you. Always read the medicine label and follow directions.    Take your medicine as directed. Contact your healthcare provider if you think your medicine is not helping or if you have side effects. Tell your provider if you are allergic to any medicine. Keep a list of the medicines, vitamins, and herbs you take. Include the amounts, and when and why you take them. Bring the list or the pill bottles to follow-up visits. Carry your medicine list with you in case of an emergency.  Self-care:    Apply a warm compress to your abscess. This will help it open and drain. Wet a washcloth in warm, but not hot, water. Apply the compress for 10 minutes. Repeat this 4 times each day. Do not press on an abscess or try to open it with a needle. You may push the bacteria deeper or into your blood.    Do not share your clothes, towels, or sheets with anyone. This can spread the infection to others.    Wash your hands often. This can help prevent the spread of germs. Use soap and water or an alcohol-based hand rub.  Handwashing  Care for your wound after it is drained:    Care for your wound as directed. If your healthcare provider says it is okay, carefully remove the bandage and gauze packing. You may need to soak the gauze to get it out of your wound. Clean your wound and the area around it as directed. Dry the area and put on new, clean bandages. Change your bandages when they get wet or dirty.    Ask your healthcare provider how to change the gauze in your wound. Keep track of how many pieces of gauze are placed inside the wound. Do not put too much packing in the wound. Do not pack the gauze too tightly in your wound.  Follow up with your healthcare provider in 1 to 3 days: You may need to have your packing removed or your bandage changed. Write down your questions so you remember to ask them during your visits.      Foot Care for People with Diabetes    WHAT YOU NEED TO KNOW:    Long-term high blood sugar levels can damage the blood vessels and nerves in your legs and feet. This damage makes it hard to feel pressure, pain, temperature, and touch. You may not be able to feel a cut or sore, or shoes that are too tight. Foot care is needed to prevent serious problems, such as an infection or amputation. Diabetes may cause your toes to become crooked or curved under. These changes may affect the way you walk and can lead to increased pressure on your foot. The pressure can decrease blood flow to your feet. Lack of blood flow increases your risk for a foot ulcer.    DISCHARGE INSTRUCTIONS:    Call your care team provider if:    Your feet become numb, weak, or hard to move.    You have pus draining from a sore on your foot.    You have a wound on your foot that gets bigger, deeper, or does not heal.    You see blisters, cuts, scratches, calluses, or sores on your foot.    You have a fever, and your feet become red, warm, and swollen.    Your toenails become thick, curled, or yellow.    You find it hard to check your feet because your vision is poor.    You have questions or concerns about your condition or care.  Foot care:    Check your feet each day. Look at your whole foot, including the bottom, and between and under your toes. Check for wounds, corns, and calluses. Use a mirror to see the bottom of your feet. The skin on your feet may be shiny, tight, or darker than normal. Your feet may also be cold and pale. Feel your feet by running your hands along the tops, bottoms, sides, and between your toes. Redness, swelling, and warmth are signs of blood flow problems that can lead to a foot ulcer. Do not try to remove corns or calluses yourself. Do not ignore small problems, such as dry skin or small wounds. These can become life-threatening over time without proper care.  Diabetic  Foot Care      Wash your feet each day with soap and warm water. Do not use hot water, because this can injure your foot. Dry your feet gently with a towel after you wash them. Dry between and under your toes.    Apply lotion or a moisturizer on your dry feet. Ask your care team provider what lotions are best to use. Do not put lotion or moisturizer between your toes. Moisture between your toes could lead to skin breakdown.    Cut your toenails correctly. File or cut your toenails straight across. Use a soft brush to clean around your toenails. If your toenails are very thick, you may need to have a care team provider or specialist cut them.    Protect your feet. Do not walk barefoot or wear your shoes without socks. Check your shoes for rocks or other objects that can hurt your feet. Wear cotton socks to help keep your feet dry. Wear socks without toe seams, or wear them with the seams inside out. Change your socks each day. Do not wear socks that are dirty or damp.    Wear shoes that fit well. Wear shoes that do not rub against any area of your feet. Your shoes should be ½ to ¾ inch (1 to 2 centimeters) longer than your feet. Your shoes should also have extra space around the widest part of your feet. Walking or athletic shoes with laces or straps that adjust are best. Ask your care team provider for help to choose shoes that fit you best. Ask your provider if you need to wear an insert, orthotic, or bandage on your feet.  Properly Fitting Shoes      Do not smoke. Smoking can damage your blood vessels and put you at increased risk for foot ulcers. Ask your care team provider for information if you currently smoke and need help to quit. E-cigarettes or smokeless tobacco still contain nicotine. Talk to your care team provider before you use these products.  Follow up with your diabetes care team provider or foot specialist as directed: You will need to have your feet checked at least 1 time each year. You may need a foot exam more often if you have nerve damage, foot deformities, or ulcers. Write down your questions so you remember to ask them during your visits. follow up in clinic upon discharge to   [address] [facility], [address], [place] [address].  Call [phone] to make an appointment.   Discussed with Dr. Jimenez  antibiotics as directed  return if anything hcanges or worsens      Abscess    WHAT YOU NEED TO KNOW:    A warm compress may help your abscess drain. Your healthcare provider may make a cut in the abscess so it can drain. You may need surgery to remove an abscess that is on your hands or buttocks.  Skin Abscess    DISCHARGE INSTRUCTIONS:    Return to the emergency department if:    The area around your abscess becomes very painful, warm, or has red streaks.    You have a fever and chills.    Your heart is beating faster than usual.    You feel faint or confused.  Call your doctor if:    Your abscess gets bigger or does not get better.    Your abscess returns.    You have questions or concerns about your condition or care.  Medicines: You may need any of the following:    Antibiotics help treat a bacterial infection.    Acetaminophen decreases pain and fever. It is available without a doctor's order. Ask how much to take and how often to take it. Follow directions. Read the labels of all other medicines you are using to see if they also contain acetaminophen, or ask your doctor or pharmacist. Acetaminophen can cause liver damage if not taken correctly.    NSAIDs, such as ibuprofen, help decrease swelling, pain, and fever. This medicine is available with or without a doctor's order. NSAIDs can cause stomach bleeding or kidney problems in certain people. If you take blood thinner medicine, always ask your healthcare provider if NSAIDs are safe for you. Always read the medicine label and follow directions.    Take your medicine as directed. Contact your healthcare provider if you think your medicine is not helping or if you have side effects. Tell your provider if you are allergic to any medicine. Keep a list of the medicines, vitamins, and herbs you take. Include the amounts, and when and why you take them. Bring the list or the pill bottles to follow-up visits. Carry your medicine list with you in case of an emergency.  Self-care:    Apply a warm compress to your abscess. This will help it open and drain. Wet a washcloth in warm, but not hot, water. Apply the compress for 10 minutes. Repeat this 4 times each day. Do not press on an abscess or try to open it with a needle. You may push the bacteria deeper or into your blood.    Do not share your clothes, towels, or sheets with anyone. This can spread the infection to others.    Wash your hands often. This can help prevent the spread of germs. Use soap and water or an alcohol-based hand rub.  Handwashing  Care for your wound after it is drained:    Care for your wound as directed. If your healthcare provider says it is okay, carefully remove the bandage and gauze packing. You may need to soak the gauze to get it out of your wound. Clean your wound and the area around it as directed. Dry the area and put on new, clean bandages. Change your bandages when they get wet or dirty.    Ask your healthcare provider how to change the gauze in your wound. Keep track of how many pieces of gauze are placed inside the wound. Do not put too much packing in the wound. Do not pack the gauze too tightly in your wound.  Follow up with your healthcare provider in 1 to 3 days: You may need to have your packing removed or your bandage changed. Write down your questions so you remember to ask them during your visits.      Foot Care for People with Diabetes    WHAT YOU NEED TO KNOW:    Long-term high blood sugar levels can damage the blood vessels and nerves in your legs and feet. This damage makes it hard to feel pressure, pain, temperature, and touch. You may not be able to feel a cut or sore, or shoes that are too tight. Foot care is needed to prevent serious problems, such as an infection or amputation. Diabetes may cause your toes to become crooked or curved under. These changes may affect the way you walk and can lead to increased pressure on your foot. The pressure can decrease blood flow to your feet. Lack of blood flow increases your risk for a foot ulcer.    DISCHARGE INSTRUCTIONS:    Call your care team provider if:    Your feet become numb, weak, or hard to move.    You have pus draining from a sore on your foot.    You have a wound on your foot that gets bigger, deeper, or does not heal.    You see blisters, cuts, scratches, calluses, or sores on your foot.    You have a fever, and your feet become red, warm, and swollen.    Your toenails become thick, curled, or yellow.    You find it hard to check your feet because your vision is poor.    You have questions or concerns about your condition or care.  Foot care:    Check your feet each day. Look at your whole foot, including the bottom, and between and under your toes. Check for wounds, corns, and calluses. Use a mirror to see the bottom of your feet. The skin on your feet may be shiny, tight, or darker than normal. Your feet may also be cold and pale. Feel your feet by running your hands along the tops, bottoms, sides, and between your toes. Redness, swelling, and warmth are signs of blood flow problems that can lead to a foot ulcer. Do not try to remove corns or calluses yourself. Do not ignore small problems, such as dry skin or small wounds. These can become life-threatening over time without proper care.  Diabetic  Foot Care      Wash your feet each day with soap and warm water. Do not use hot water, because this can injure your foot. Dry your feet gently with a towel after you wash them. Dry between and under your toes.    Apply lotion or a moisturizer on your dry feet. Ask your care team provider what lotions are best to use. Do not put lotion or moisturizer between your toes. Moisture between your toes could lead to skin breakdown.    Cut your toenails correctly. File or cut your toenails straight across. Use a soft brush to clean around your toenails. If your toenails are very thick, you may need to have a care team provider or specialist cut them.    Protect your feet. Do not walk barefoot or wear your shoes without socks. Check your shoes for rocks or other objects that can hurt your feet. Wear cotton socks to help keep your feet dry. Wear socks without toe seams, or wear them with the seams inside out. Change your socks each day. Do not wear socks that are dirty or damp.    Wear shoes that fit well. Wear shoes that do not rub against any area of your feet. Your shoes should be ½ to ¾ inch (1 to 2 centimeters) longer than your feet. Your shoes should also have extra space around the widest part of your feet. Walking or athletic shoes with laces or straps that adjust are best. Ask your care team provider for help to choose shoes that fit you best. Ask your provider if you need to wear an insert, orthotic, or bandage on your feet.  Properly Fitting Shoes      Do not smoke. Smoking can damage your blood vessels and put you at increased risk for foot ulcers. Ask your care team provider for information if you currently smoke and need help to quit. E-cigarettes or smokeless tobacco still contain nicotine. Talk to your care team provider before you use these products.  Follow up with your diabetes care team provider or foot specialist as directed: You will need to have your feet checked at least 1 time each year. You may need a foot exam more often if you have nerve damage, foot deformities, or ulcers. Write down your questions so you remember to ask them during your visits. follow up in clinic upon discharge to   [address] [facility], [address], [place] [address].  Call [phone] to make an appointment.   Discussed with Dr. Jimenez  antibiotics as directed  return if anything hcanges or worsens      Abscess    WHAT YOU NEED TO KNOW:    A warm compress may help your abscess drain. Your healthcare provider may make a cut in the abscess so it can drain. You may need surgery to remove an abscess that is on your hands or buttocks.  Skin Abscess    DISCHARGE INSTRUCTIONS:    Return to the emergency department if:    The area around your abscess becomes very painful, warm, or has red streaks.    You have a fever and chills.    Your heart is beating faster than usual.    You feel faint or confused.  Call your doctor if:    Your abscess gets bigger or does not get better.    Your abscess returns.    You have questions or concerns about your condition or care.  Medicines: You may need any of the following:    Antibiotics help treat a bacterial infection.    Acetaminophen decreases pain and fever. It is available without a doctor's order. Ask how much to take and how often to take it. Follow directions. Read the labels of all other medicines you are using to see if they also contain acetaminophen, or ask your doctor or pharmacist. Acetaminophen can cause liver damage if not taken correctly.    NSAIDs, such as ibuprofen, help decrease swelling, pain, and fever. This medicine is available with or without a doctor's order. NSAIDs can cause stomach bleeding or kidney problems in certain people. If you take blood thinner medicine, always ask your healthcare provider if NSAIDs are safe for you. Always read the medicine label and follow directions.    Take your medicine as directed. Contact your healthcare provider if you think your medicine is not helping or if you have side effects. Tell your provider if you are allergic to any medicine. Keep a list of the medicines, vitamins, and herbs you take. Include the amounts, and when and why you take them. Bring the list or the pill bottles to follow-up visits. Carry your medicine list with you in case of an emergency.  Self-care:    Apply a warm compress to your abscess. This will help it open and drain. Wet a washcloth in warm, but not hot, water. Apply the compress for 10 minutes. Repeat this 4 times each day. Do not press on an abscess or try to open it with a needle. You may push the bacteria deeper or into your blood.    Do not share your clothes, towels, or sheets with anyone. This can spread the infection to others.    Wash your hands often. This can help prevent the spread of germs. Use soap and water or an alcohol-based hand rub.  Handwashing  Care for your wound after it is drained:    Care for your wound as directed. If your healthcare provider says it is okay, carefully remove the bandage and gauze packing. You may need to soak the gauze to get it out of your wound. Clean your wound and the area around it as directed. Dry the area and put on new, clean bandages. Change your bandages when they get wet or dirty.    Ask your healthcare provider how to change the gauze in your wound. Keep track of how many pieces of gauze are placed inside the wound. Do not put too much packing in the wound. Do not pack the gauze too tightly in your wound.  Follow up with your healthcare provider in 1 to 3 days: You may need to have your packing removed or your bandage changed. Write down your questions so you remember to ask them during your visits.      Foot Care for People with Diabetes    WHAT YOU NEED TO KNOW:    Long-term high blood sugar levels can damage the blood vessels and nerves in your legs and feet. This damage makes it hard to feel pressure, pain, temperature, and touch. You may not be able to feel a cut or sore, or shoes that are too tight. Foot care is needed to prevent serious problems, such as an infection or amputation. Diabetes may cause your toes to become crooked or curved under. These changes may affect the way you walk and can lead to increased pressure on your foot. The pressure can decrease blood flow to your feet. Lack of blood flow increases your risk for a foot ulcer.    DISCHARGE INSTRUCTIONS:    Call your care team provider if:    Your feet become numb, weak, or hard to move.    You have pus draining from a sore on your foot.    You have a wound on your foot that gets bigger, deeper, or does not heal.    You see blisters, cuts, scratches, calluses, or sores on your foot.    You have a fever, and your feet become red, warm, and swollen.    Your toenails become thick, curled, or yellow.    You find it hard to check your feet because your vision is poor.    You have questions or concerns about your condition or care.  Foot care:    Check your feet each day. Look at your whole foot, including the bottom, and between and under your toes. Check for wounds, corns, and calluses. Use a mirror to see the bottom of your feet. The skin on your feet may be shiny, tight, or darker than normal. Your feet may also be cold and pale. Feel your feet by running your hands along the tops, bottoms, sides, and between your toes. Redness, swelling, and warmth are signs of blood flow problems that can lead to a foot ulcer. Do not try to remove corns or calluses yourself. Do not ignore small problems, such as dry skin or small wounds. These can become life-threatening over time without proper care.  Diabetic  Foot Care      Wash your feet each day with soap and warm water. Do not use hot water, because this can injure your foot. Dry your feet gently with a towel after you wash them. Dry between and under your toes.    Apply lotion or a moisturizer on your dry feet. Ask your care team provider what lotions are best to use. Do not put lotion or moisturizer between your toes. Moisture between your toes could lead to skin breakdown.    Cut your toenails correctly. File or cut your toenails straight across. Use a soft brush to clean around your toenails. If your toenails are very thick, you may need to have a care team provider or specialist cut them.    Protect your feet. Do not walk barefoot or wear your shoes without socks. Check your shoes for rocks or other objects that can hurt your feet. Wear cotton socks to help keep your feet dry. Wear socks without toe seams, or wear them with the seams inside out. Change your socks each day. Do not wear socks that are dirty or damp.    Wear shoes that fit well. Wear shoes that do not rub against any area of your feet. Your shoes should be ½ to ¾ inch (1 to 2 centimeters) longer than your feet. Your shoes should also have extra space around the widest part of your feet. Walking or athletic shoes with laces or straps that adjust are best. Ask your care team provider for help to choose shoes that fit you best. Ask your provider if you need to wear an insert, orthotic, or bandage on your feet.  Properly Fitting Shoes      Do not smoke. Smoking can damage your blood vessels and put you at increased risk for foot ulcers. Ask your care team provider for information if you currently smoke and need help to quit. E-cigarettes or smokeless tobacco still contain nicotine. Talk to your care team provider before you use these products.  Follow up with your diabetes care team provider or foot specialist as directed: You will need to have your feet checked at least 1 time each year. You may need a foot exam more often if you have nerve damage, foot deformities, or ulcers. Write down your questions so you remember to ask them during your visits.

## 2023-10-20 NOTE — ED ADULT NURSE NOTE - OBJECTIVE STATEMENT
Pt presents to the Ed complaining of right foot pain for 1 week. Describes pain as 8/10. Motrin was taken but pain continues. Swelling noted to right foot along with brownish-yellowish dorsal foot abscess. Skin intact. lower 3rd of leg appears discolored. Pt's wife states he usually walks but has trouble walking today. Pt in NAD. A&Ox4. Safety maintained.

## 2023-10-20 NOTE — ED ADULT NURSE NOTE - NSFALLHARMRISKINTERV_ED_ALL_ED
Assistance OOB with selected safe patient handling equipment if applicable/Communicate risk of Fall with Harm to all staff, patient, and family/Provide visual cue: red socks, yellow wristband, yellow gown, etc/Reinforce activity limits and safety measures with patient and family/Bed in lowest position, wheels locked, appropriate side rails in place/Call bell, personal items and telephone in reach/Instruct patient to call for assistance before getting out of bed/chair/stretcher/Non-slip footwear applied when patient is off stretcher/[place] to call system/Physically safe environment - no spills, clutter or unnecessary equipment/Purposeful Proactive Rounding/Room/bathroom lighting operational, light cord in reach Assistance OOB with selected safe patient handling equipment if applicable/Communicate risk of Fall with Harm to all staff, patient, and family/Provide visual cue: red socks, yellow wristband, yellow gown, etc/Reinforce activity limits and safety measures with patient and family/Bed in lowest position, wheels locked, appropriate side rails in place/Call bell, personal items and telephone in reach/Instruct patient to call for assistance before getting out of bed/chair/stretcher/Non-slip footwear applied when patient is off stretcher/[place] to call system/Physically safe environment - no spills, clutter or unnecessary equipment/Purposeful Proactive Rounding/Room/bathroom lighting operational, light cord in reach Assistance OOB with selected safe patient handling equipment if applicable/Communicate risk of Fall with Harm to all staff, patient, and family/Provide visual cue: red socks, yellow wristband, yellow gown, etc/Reinforce activity limits and safety measures with patient and family/Bed in lowest position, wheels locked, appropriate side rails in place/Call bell, personal items and telephone in reach/Instruct patient to call for assistance before getting out of bed/chair/stretcher/Non-slip footwear applied when patient is off stretcher/[place] to call system/Physically safe environment - no spills, clutter or unnecessary equipment/Purposeful Proactive Rounding/Room/bathroom lighting operational, light cord in reach

## 2023-10-20 NOTE — ED PROVIDER NOTE - PROGRESS NOTE DETAILS
labs rreviewed, slight anemia, xr unremarkable  podiatrist drained abscess, ok for dc for outpt fu  pt agreablt o plan  return to [facility] provided labs rreviewed, slight anemia, xr unremarkable  podiatrist drained abscess, ok for dc for outpt fu  pt agreablt o plan  return to [facility] provided labs rreviewed, slight anemia, xr unremarkable  podiatrist drained abscess, ok for dc for outpt fu  pt agreablt o plan  return to [facility] provided

## 2023-10-20 NOTE — CONSULT NOTE ADULT - SUBJECTIVE AND OBJECTIVE BOX
68-year-old male presents with wife for foot injury she provides most the history declines official . He is diabetic and has had foot problems in the past specially other foot. His wife notes he had foot pain for about a week was limping a little bit it is a bit swollen. 2 days ago she noted a small doctor to the foot on the plantar surface between the second third digit. Today she noticed it was much larger and more blanching like, more painful no fevers or chills no injury or trauma non-smoker nondrinker nondrug user brings him in for evaluation.    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     Patient admits to  (-) Fevers, (-) Chills, (-) Nausea, (-) Vomiting, (-) Shortness of Breath (-) calf pain (-) chest pain     Medications   FH,   PMHDM (diabetes mellitus)       PS    Labs                          12.4   7.58  )-----------( 251      ( 20 Oct 2023 11:40 )             38.2      10-20    139  |  105  |  23<H>  ----------------------------<  79  4.0   |  26  |  1.22    Ca    9.7      20 Oct 2023 11:40    TPro  10.0<H>  /  Alb  4.5  /  TBili  0.4  /  DBili  x   /  AST  16  /  ALT  26  /  AlkPhos  99  10-20     Vital Signs Last 24 Hrs  T(C): 36.9 (20 Oct 2023 10:09), Max: 36.9 (20 Oct 2023 10:09)  T(F): 98.4 (20 Oct 2023 10:09), Max: 98.4 (20 Oct 2023 10:09)  HR: 66 (20 Oct 2023 10:09) (66 - 66)  BP: 148/77 (20 Oct 2023 10:09) (148/77 - 148/77)  BP(mean): --  RR: 20 (20 Oct 2023 10:09) (20 - 20)  SpO2: 99% (20 Oct 2023 10:09) (99% - 99%)    Parameters below as of 20 Oct 2023 10:09  Patient On (Oxygen Delivery Method): room air      Sedimentation Rate, Erythrocyte: 18 mm/Hr (10-20-23 @ 11:40)          WBC Count: 7.58 K/uL (10-20-23 @ 11:40)      ROS: Unremarkable outside HPI      LE Focused Exam  Vascular: DP 2/4 PT 2/4 b/l. CFT <3 secs x 10. Temp Gradient warm to cool b/l. Pedal hair present.  Dermatological: + abscess to the plantar aspect of the right foot adjacent to the 2nd metatarsal. +fluctuance -malodor, -ptb,   Neurological: Patient is awake, alert and oriented x3. Gross Epicritic sensation is intact via ipswitch test 4/4 b/l. Vibratory sensation is present at the 1st MTPJ  MSK: Muscle strength 5/5 was notes in all compartments. + pain on palpation at site of abscess. Arch height 2/5 on-WB, joint ROM wnl with no crepitation. Negative tessa sign b/l

## 2023-10-21 LAB
A1C WITH ESTIMATED AVERAGE GLUCOSE RESULT: 6.7 % — HIGH (ref 4–5.6)
ESTIMATED AVERAGE GLUCOSE: 146 MG/DL — HIGH (ref 68–114)

## 2023-10-22 LAB
-  AMPICILLIN: SIGNIFICANT CHANGE UP
-  TETRACYCLINE: SIGNIFICANT CHANGE UP
-  VANCOMYCIN: SIGNIFICANT CHANGE UP
METHOD TYPE: SIGNIFICANT CHANGE UP

## 2023-10-23 NOTE — ED POST DISCHARGE NOTE - ADDITIONAL DOCUMENTATION
Followed up with podiatry, is healing appropriately per documentation, no change in treatment indicated

## 2023-10-25 ENCOUNTER — APPOINTMENT (OUTPATIENT)
Age: 68
End: 2023-10-25

## 2023-10-25 ENCOUNTER — OUTPATIENT (OUTPATIENT)
Dept: OUTPATIENT SERVICES | Facility: HOSPITAL | Age: 68
LOS: 1 days | End: 2023-10-25
Payer: COMMERCIAL

## 2023-10-25 VITALS
WEIGHT: 205 LBS | RESPIRATION RATE: 18 BRPM | OXYGEN SATURATION: 98 % | SYSTOLIC BLOOD PRESSURE: 121 MMHG | DIASTOLIC BLOOD PRESSURE: 71 MMHG | HEIGHT: 71 IN | BODY MASS INDEX: 28.7 KG/M2 | HEART RATE: 66 BPM | TEMPERATURE: 98.5 F

## 2023-10-25 DIAGNOSIS — E11.9 TYPE 2 DIABETES MELLITUS W/OUT COMPLICATIONS: ICD-10-CM

## 2023-10-25 DIAGNOSIS — L02.611 CUTANEOUS ABSCESS OF RIGHT FOOT: ICD-10-CM

## 2023-10-25 DIAGNOSIS — Z87.891 PERSONAL HISTORY OF NICOTINE DEPENDENCE: ICD-10-CM

## 2023-10-25 DIAGNOSIS — Z00.00 ENCOUNTER FOR GENERAL ADULT MEDICAL EXAMINATION WITHOUT ABNORMAL FINDINGS: ICD-10-CM

## 2023-10-25 LAB
CULTURE RESULTS: ABNORMAL
CULTURE RESULTS: SIGNIFICANT CHANGE UP
ORGANISM # SPEC MICROSCOPIC CNT: ABNORMAL
SPECIMEN SOURCE: SIGNIFICANT CHANGE UP

## 2023-10-25 PROCEDURE — G0463: CPT

## 2023-10-25 PROCEDURE — 11042 DBRDMT SUBQ TIS 1ST 20SQCM/<: CPT

## 2023-10-25 RX ORDER — EMPAGLIFLOZIN 25 MG/1
TABLET, FILM COATED ORAL
Refills: 0 | Status: ACTIVE | COMMUNITY

## 2023-10-25 RX ORDER — METFORMIN HYDROCHLORIDE 625 MG/1
TABLET ORAL
Refills: 0 | Status: ACTIVE | COMMUNITY

## 2023-10-26 DIAGNOSIS — E11.621 TYPE 2 DIABETES MELLITUS WITH FOOT ULCER: ICD-10-CM

## 2023-10-26 DIAGNOSIS — L97.512 NON-PRESSURE CHRONIC ULCER OF OTHER PART OF RIGHT FOOT WITH FAT LAYER EXPOSED: ICD-10-CM

## 2024-04-09 ENCOUNTER — EMERGENCY (EMERGENCY)
Facility: HOSPITAL | Age: 69
LOS: 1 days | Discharge: HOME | End: 2024-04-09
Attending: EMERGENCY MEDICINE
Payer: COMMERCIAL

## 2024-04-09 VITALS
SYSTOLIC BLOOD PRESSURE: 121 MMHG | DIASTOLIC BLOOD PRESSURE: 78 MMHG | TEMPERATURE: 98 F | WEIGHT: 207.23 LBS | HEART RATE: 73 BPM | RESPIRATION RATE: 17 BRPM | OXYGEN SATURATION: 98 % | HEIGHT: 71 IN

## 2024-04-09 VITALS
HEART RATE: 62 BPM | TEMPERATURE: 98 F | OXYGEN SATURATION: 98 % | RESPIRATION RATE: 17 BRPM | SYSTOLIC BLOOD PRESSURE: 106 MMHG | DIASTOLIC BLOOD PRESSURE: 66 MMHG

## 2024-04-09 LAB
ALBUMIN SERPL ELPH-MCNC: 3.5 G/DL — SIGNIFICANT CHANGE UP (ref 3.5–5)
ALP SERPL-CCNC: 59 U/L — SIGNIFICANT CHANGE UP (ref 40–120)
ALT FLD-CCNC: 15 U/L DA — SIGNIFICANT CHANGE UP (ref 10–60)
ANION GAP SERPL CALC-SCNC: 7 MMOL/L — SIGNIFICANT CHANGE UP (ref 5–17)
AST SERPL-CCNC: 18 U/L — SIGNIFICANT CHANGE UP (ref 10–40)
BASOPHILS # BLD AUTO: 0.04 K/UL — SIGNIFICANT CHANGE UP (ref 0–0.2)
BASOPHILS NFR BLD AUTO: 0.6 % — SIGNIFICANT CHANGE UP (ref 0–2)
BILIRUB SERPL-MCNC: 0.3 MG/DL — SIGNIFICANT CHANGE UP (ref 0.2–1.2)
BLD GP AB SCN SERPL QL: SIGNIFICANT CHANGE UP
BUN SERPL-MCNC: 27 MG/DL — HIGH (ref 7–18)
CALCIUM SERPL-MCNC: 8.6 MG/DL — SIGNIFICANT CHANGE UP (ref 8.4–10.5)
CHLORIDE SERPL-SCNC: 108 MMOL/L — SIGNIFICANT CHANGE UP (ref 96–108)
CO2 SERPL-SCNC: 25 MMOL/L — SIGNIFICANT CHANGE UP (ref 22–31)
CREAT SERPL-MCNC: 1.08 MG/DL — SIGNIFICANT CHANGE UP (ref 0.5–1.3)
EGFR: 74 ML/MIN/1.73M2 — SIGNIFICANT CHANGE UP
EOSINOPHIL # BLD AUTO: 0.19 K/UL — SIGNIFICANT CHANGE UP (ref 0–0.5)
EOSINOPHIL NFR BLD AUTO: 2.7 % — SIGNIFICANT CHANGE UP (ref 0–6)
GLUCOSE SERPL-MCNC: 101 MG/DL — HIGH (ref 70–99)
HCT VFR BLD CALC: 34.1 % — LOW (ref 39–50)
HCT VFR BLD CALC: 35.6 % — LOW (ref 39–50)
HGB BLD-MCNC: 11.1 G/DL — LOW (ref 13–17)
HGB BLD-MCNC: 11.7 G/DL — LOW (ref 13–17)
HIV 1 & 2 AB SERPL IA.RAPID: SIGNIFICANT CHANGE UP
IMM GRANULOCYTES NFR BLD AUTO: 0.3 % — SIGNIFICANT CHANGE UP (ref 0–0.9)
LYMPHOCYTES # BLD AUTO: 1.41 K/UL — SIGNIFICANT CHANGE UP (ref 1–3.3)
LYMPHOCYTES # BLD AUTO: 20.3 % — SIGNIFICANT CHANGE UP (ref 13–44)
MAGNESIUM SERPL-MCNC: 2.1 MG/DL — SIGNIFICANT CHANGE UP (ref 1.6–2.6)
MCHC RBC-ENTMCNC: 30.8 PG — SIGNIFICANT CHANGE UP (ref 27–34)
MCHC RBC-ENTMCNC: 31 PG — SIGNIFICANT CHANGE UP (ref 27–34)
MCHC RBC-ENTMCNC: 32.6 GM/DL — SIGNIFICANT CHANGE UP (ref 32–36)
MCHC RBC-ENTMCNC: 32.9 GM/DL — SIGNIFICANT CHANGE UP (ref 32–36)
MCV RBC AUTO: 94.4 FL — SIGNIFICANT CHANGE UP (ref 80–100)
MCV RBC AUTO: 94.7 FL — SIGNIFICANT CHANGE UP (ref 80–100)
MONOCYTES # BLD AUTO: 0.6 K/UL — SIGNIFICANT CHANGE UP (ref 0–0.9)
MONOCYTES NFR BLD AUTO: 8.6 % — SIGNIFICANT CHANGE UP (ref 2–14)
NEUTROPHILS # BLD AUTO: 4.69 K/UL — SIGNIFICANT CHANGE UP (ref 1.8–7.4)
NEUTROPHILS NFR BLD AUTO: 67.5 % — SIGNIFICANT CHANGE UP (ref 43–77)
NRBC # BLD: 0 /100 WBCS — SIGNIFICANT CHANGE UP (ref 0–0)
NRBC # BLD: 0 /100 WBCS — SIGNIFICANT CHANGE UP (ref 0–0)
PHOSPHATE SERPL-MCNC: 3.7 MG/DL — SIGNIFICANT CHANGE UP (ref 2.5–4.5)
PLATELET # BLD AUTO: 211 K/UL — SIGNIFICANT CHANGE UP (ref 150–400)
PLATELET # BLD AUTO: 225 K/UL — SIGNIFICANT CHANGE UP (ref 150–400)
POTASSIUM SERPL-MCNC: 4.2 MMOL/L — SIGNIFICANT CHANGE UP (ref 3.5–5.3)
POTASSIUM SERPL-SCNC: 4.2 MMOL/L — SIGNIFICANT CHANGE UP (ref 3.5–5.3)
PROT SERPL-MCNC: 7.3 G/DL — SIGNIFICANT CHANGE UP (ref 6–8.3)
RBC # BLD: 3.6 M/UL — LOW (ref 4.2–5.8)
RBC # BLD: 3.77 M/UL — LOW (ref 4.2–5.8)
RBC # FLD: 13.5 % — SIGNIFICANT CHANGE UP (ref 10.3–14.5)
RBC # FLD: 13.6 % — SIGNIFICANT CHANGE UP (ref 10.3–14.5)
SODIUM SERPL-SCNC: 140 MMOL/L — SIGNIFICANT CHANGE UP (ref 135–145)
TROPONIN I, HIGH SENSITIVITY RESULT: 17.1 NG/L — SIGNIFICANT CHANGE UP
TROPONIN I, HIGH SENSITIVITY RESULT: 17.6 NG/L — SIGNIFICANT CHANGE UP
WBC # BLD: 6.31 K/UL — SIGNIFICANT CHANGE UP (ref 3.8–10.5)
WBC # BLD: 6.95 K/UL — SIGNIFICANT CHANGE UP (ref 3.8–10.5)
WBC # FLD AUTO: 6.31 K/UL — SIGNIFICANT CHANGE UP (ref 3.8–10.5)
WBC # FLD AUTO: 6.95 K/UL — SIGNIFICANT CHANGE UP (ref 3.8–10.5)

## 2024-04-09 PROCEDURE — 84484 ASSAY OF TROPONIN QUANT: CPT

## 2024-04-09 PROCEDURE — 85027 COMPLETE CBC AUTOMATED: CPT

## 2024-04-09 PROCEDURE — 86900 BLOOD TYPING SEROLOGIC ABO: CPT

## 2024-04-09 PROCEDURE — 86901 BLOOD TYPING SEROLOGIC RH(D): CPT

## 2024-04-09 PROCEDURE — 86850 RBC ANTIBODY SCREEN: CPT

## 2024-04-09 PROCEDURE — 71046 X-RAY EXAM CHEST 2 VIEWS: CPT

## 2024-04-09 PROCEDURE — 86703 HIV-1/HIV-2 1 RESULT ANTBDY: CPT

## 2024-04-09 PROCEDURE — 71046 X-RAY EXAM CHEST 2 VIEWS: CPT | Mod: 26

## 2024-04-09 PROCEDURE — 85025 COMPLETE CBC W/AUTO DIFF WBC: CPT

## 2024-04-09 PROCEDURE — 99285 EMERGENCY DEPT VISIT HI MDM: CPT

## 2024-04-09 PROCEDURE — 83735 ASSAY OF MAGNESIUM: CPT

## 2024-04-09 PROCEDURE — 93005 ELECTROCARDIOGRAM TRACING: CPT

## 2024-04-09 PROCEDURE — 84100 ASSAY OF PHOSPHORUS: CPT

## 2024-04-09 PROCEDURE — 99284 EMERGENCY DEPT VISIT MOD MDM: CPT

## 2024-04-09 PROCEDURE — 36415 COLL VENOUS BLD VENIPUNCTURE: CPT

## 2024-04-09 PROCEDURE — 80053 COMPREHEN METABOLIC PANEL: CPT

## 2024-04-09 PROCEDURE — 82962 GLUCOSE BLOOD TEST: CPT

## 2024-04-09 NOTE — ED PROVIDER NOTE - PROGRESS NOTE DETAILS
Results reviewed.  Pt reports feeling better.  No events during ED observation  Patient/Family advised regarding symptomatic/supportive care, importance of outpatient follow up, and symptoms to prompt ED return.

## 2024-04-09 NOTE — ED PROVIDER NOTE - NSFOLLOWUPINSTRUCTIONS_ED_ALL_ED_FT
Please follow up with your PMD or Medicine Clinic in 2-3 days.  Follow up with Vascular Surgery in 1 week.  Return to the ER for worsening or concerning symptoms.  Keep wound clean and dry.    - - - - - - - - - - - - -  Syncope, Adult  Outline of the head showing blood vessels that supply the brain.  Syncope is when you pass out or faint for a short time. It is caused by a sudden decrease in blood flow to the brain. This can happen for many reasons. It can sometimes happen when seeing blood, getting a shot (injection), or having pain or strong emotions. Most causes of fainting are not dangerous, but in some cases it can be a sign of a serious medical problem.    If you faint, get help right away. Call your local emergency services (911 in the [place]    Follow these instructions at home:  Watch for any changes in your symptoms. Take these actions to stay safe and help with your symptoms:    Knowing when you may be about to faint    Signs that you may be about to faint include:  Feeling dizzy or light-headed. It may feel like the room is spinning.  Feeling weak.  Feeling like you may vomit (nauseous).  Seeing spots or seeing all white or all black.  Having cold, clammy skin.  Feeling warm and sweaty.  Hearing ringing in the ears.  If you start to feel like you might faint, sit or lie down right away. If sitting, lower your head down between your legs. If lying down, raise (elevate) your feet above the level of your heart.  Breathe deeply and steadily. Wait until all of the symptoms are gone.  Have someone stay with you until you feel better.  Medicines    Take over-the-counter and prescription medicines only as told by your doctor.  If you are taking blood pressure or heart medicine, sit up and stand up slowly. Spend a few minutes getting ready to sit and then stand. This can help you feel less dizzy.  Lifestyle    Do not drive, use machinery, or play sports until your doctor says it is okay.  Do not drink alcohol.  Do not smoke or use any products that contain nicotine or tobacco. If you need help quitting, ask your doctor.  Avoid hot tubs and saunas.  General instructions    Talk with your doctor about your symptoms. You may need to have testing to help find the cause.  Drink enough fluid to keep your pee (urine) pale yellow.  Avoid standing for a long time. If you must stand for a long time, do movements such as:  Moving your legs.  Crossing your legs.  Flexing and stretching your leg muscles.  Squatting.  Keep all follow-up visits.  Contact a doctor if:  You have episodes of near fainting.  Get help right away if:  You pass out or faint.  You hit your head or are injured after fainting.  You have any of these symptoms:  Fast or uneven heartbeats (palpitations).  Pain in your chest, belly, or back.  Shortness of breath.  You have jerky movements that you cannot control (seizure).  You have a very bad headache.  You are confused.  You have problems with how you see (vision).  You are very weak.  You have trouble walking.  You are bleeding from your mouth or your butt (rectum).  You have black or tarry poop (stool).  These symptoms may be an emergency. Get help right away. Call your local emergency services (911 in the [place]  Do not wait to see if the symptoms will go away.  Do not drive yourself to the hospital.  Summary  Syncope is when you pass out or faint for a short time. It is caused by a sudden decrease in blood flow to the brain.  Signs that you may be about to faint include feeling dizzy or light-headed, feeling like you may vomit, seeing all white or all black, or having cold, clammy skin.  If you start to feel like you might faint, sit or lie down right away. Lower your head if sitting, or raise (elevate) your feet if lying down. Breathe deeply and steadily. Wait until all of the symptoms are gone.  This information is not intended to replace advice given to you by your health care provider. Make sure you discuss any questions you have with your health care provider.  - - - - - - - - - - - - -  Síncope en los adultos  Syncope, Adult  Outline of the head showing blood vessels that supply the brain.  Un síncope ocurre cuando tavo persona se desvanece o se desmaya jaxon un corto tiempo. La causa del síncope es tavo disminución súbita del flujo de malena al cerebro. Puede ocurrir por muchas razones. A veces, puede ocurrir cuando se ve malena, se aplica tavo inyección, o cuando se siente dolor o emociones valerie. En gongora mayoría, las causas del desmayo no son peligrosas, gabbi, en algunos casos, pueden ser un signo de un problema médico grave.    Si se desmaya, solicite ayuda de inmediato. Comuníquese con el servicio de emergencias de gongora localidad (911 en los [place]).    Siga estas indicaciones en gongora casa:  Controle si hay algún cambio en evangelist síntomas. Para mantener gongora seguridad y ayudar con los síntomas, tome estas medidas:    Cómo saber cuándo puede estar a punto de desmayarse    Los signos de que alguien está por desmayarse incluyen lo siguiente:  Sentirse mareado o aturdido. Sentir que la habitación da vueltas.  Sensación de debilidad.  Sensación de que va a vomitar (náuseas).  Piyush manchas o piyush todo lechuga o todo mariana.  Tener la piel fría y húmeda.  Sentir calor y sudar.  Tener zumbidos en el oído.  Si comienza a sentir que podría desmayarse, siéntese o recuéstese inmediatamente. Si se sienta, coloque la teressa entre las piernas. Si se recuesta, levante (eleve) los pies por encima del nivel del corazón.  Respire profundamente y de manera continua. Espere hasta que los síntomas hayan desaparecido.  Pídale a alguien que se quede con usted hasta que se sienta mejor.  Medicamentos    Use los medicamentos de venta tammie y los recetados solamente stephen se lo haya indicado el médico.  Si kanwal medicamentos para la presión arterial o para el corazón, levántese y siéntese lentamente. Dedique unos minutos a prepararse para sentarse y después levantarse. [place] puede ayudarlo a sentirse menos mareado.  Estilo de christie    No conduzca vehículos, no use maquinarias ni practique deportes hasta que el médico lo autorice.  No susan alcohol.  No fume ni consuma ningún producto que contenga nicotina o tabaco. Si necesita ayuda para dejar de consumir estos productos, consulte al médico.  Evite saunas y bañeras de hidromasajes.  Indicaciones generales    Hable con el médico sobre evangelist síntomas. Es posible que deba realizarse estudios para encontrar la causa.  Susan suficiente líquido para mantener el pis (la orina) de color amarillo pálido.  Evite estar de pie jaxon mucho tiempo. Si debe permanecer de pie jaxon mucho tiempo, realice movimientos stephen los siguientes:   las piernas.  Cruzar las piernas.  Flexionar y estirar los músculos de las piernas.  Ponerse en cuclillas.  Concurra a todas las visitas de seguimiento.  Comuníquese con un médico si:  Tiene episodios stephen si fuera a desmayarse.  Solicite ayuda de inmediato si:  Pierde el conocimiento o se desmaya.  Se golpea la teressa o se lesiona después de desmayarse.  Tiene alguno de estos síntomas:  Latidos cardíacos acelerados o irregulares (palpitaciones).  Dolor en el pecho, el vientre o la espalda.  Falta de aire.  Tiene tavo crisis de movimientos que no puede controlar (convulsiones).  Tiene un dolor de teressa muy intenso.  Se siente confundido.  Tiene dificultad para piyush (problemas de visión).  Se siente muy débil.  Presenta dificultad para caminar.  Le sangran la boca o el ano (recto).  La materia fecal (heces) es kathy o de aspecto alquitranado.  Estos síntomas pueden indicar tavo emergencia. Solicite ayuda de inmediato. Comuníquese con el servicio de emergencias de gongora localidad (911 en los [place]).  No espere a piyush si los síntomas desaparecen.  No conduzca por evangelist propios medios hasta el hospital.  Resumen  Un síncope ocurre cuando tavo persona se desvanece o se desmaya jaxon un corto tiempo. La causa del síncope es tavo disminución súbita del flujo de malena al cerebro.  Los signos de que podría estar por desmayarse incluyen sentirse mareado o aturdido, tener ganas de vomitar, piyush todo lechuga o todo mariana, o tener la piel fría y húmeda.  Si comienza a sentir que podría desmayarse, siéntese o recuéstese inmediatamente. Si se sienta, baje la teressa; si se recuesta, levante (eleve) los pies. Respire profundamente y de manera continua. Espere hasta que los síntomas hayan desaparecido.  Esta información no tiene stephen fin reemplazar el consejo del médico. Asegúrese de hacerle al médico cualquier pregunta que tenga.  - - - - - - - - - - - - -  Surgical Procedures for Varicose Veins  Varicose veins are swollen, twisted veins that are visible under the skin. They occur most often in the legs. These veins may appear blue and bulging. Varicose veins are caused by damage to the valves in veins. All veins have a valve that keeps blood flowing in only one direction. If a valve gets weak or damaged, blood can pool and cause varicose veins.    You may need surgery to treat your varicose veins if they are causing symptoms or complications, or if lifestyle changes have not helped. These procedures can lower the risk of bleeding and blood clots, and reduce pain and aching. They can also improve the way the affected area looks (cosmetic appearance).    Two common surgical procedures are:  Phlebectomy. The vein is surgically removed through a small incision. This procedure is used to remove the veins closest to the skin.  Vein ligation and stripping. The vein is surgically removed through incisions after the vein has been tied off. This procedure is used to treat severe cases.  Based on your overall condition, your health care provider will discuss the method that is best for you.    Tell a health care provider about:  Any allergies you have.  All medicines you are taking, including vitamins, herbs, eye drops, creams, and over-the-counter medicines.  Any problems you or family members have had with anesthetic medicines.  Any bleeding problems you have.  Any surgeries you have had.  Any medical conditions you have.  Whether you are pregnant or may be pregnant.  What are the risks?  Generally, this is a safe procedure. However, problems may occur, including:  Damage to nearby nerves, tissues, or veins. This can cause leg swelling.  Skin irritation, sores, or dark spots.  Clotting.  Infection.  Allergic reactions to medicines.  Need for additional treatments.  What happens before the procedure?  Medicines    Ask your health care provider about:  Changing or stopping your regular medicines. This is especially important if you are taking diabetes medicines or blood thinners.  Taking medicines such as aspirin and ibuprofen. These medicines can thin your blood. Do not take these medicines unless your health care provider tells you to take them.  Taking over-the-counter medicines, vitamins, herbs, and supplements.  Tests    You may have an exam or testing. This can include a test to:  Check for clots and check blood flow using sound waves (Doppler ultrasound).  Observe how blood flows through your veins by injecting a dye that outlines your veins on X-rays (angiogram). This test is used in rare cases.  General instructions    Follow instructions from your health care provider about eating or drinking restrictions.  Plan to have a responsible adult take you home from the hospital or clinic.  Ask your health care provider:  How your surgery site will be marked.  What steps will be taken to help prevent infection. These steps may include:  Removing hair at the surgery site.  Washing skin with a germ-killing soap.  Taking antibiotic medicine.  What happens during the procedure?  The steps of the procedure will depend on which method is used.    Phlebectomy    Varicose veins in the lower legs with close-ups showing a hook being used to pull out the affected vein.  You will be given a medicine to numb the area (local anesthetic).  A small puncture will be made close to each of the varicose veins.  A tiny hook will be used to pull out the affected vein.  Bandages (dressings) or adhesive strips will be used to cover the puncture sites.  Vein ligation and stripping    You will be given a medicine to make you fall asleep (general anesthetic).  A small incision will be made near the affected vein in your groin (saphenous vein).  The surgeon will tie off (ligate) the vein.  Several more incisions will then be made along the vein.  The vein will be removed (stripped).  The incisions will be closed with stitches (sutures) under the skin.  Bandages or adhesive strips will be used to cover the incision sites.  The procedure may vary among health care providers and hospitals.    What happens after the procedure?  Your blood pressure, heart rate, breathing rate, and blood oxygen level will be monitored until you leave the hospital or clinic.  You may have to wear compression stockings. These stockings help to prevent blood clots and reduce swelling in your legs.  Summary  Varicose veins are swollen, twisted veins that are visible under the skin. They occur most often in the legs.  You may need surgery to treat your varicose veins if they are causing symptoms or complications, or if lifestyle changes have not helped.  Your health care provider will discuss the method that is best for you based on your condition.  This information is not intended to replace advice given to you by your health care provider. Make sure you discuss any questions you have with your health care provider.  - - - - - - - - - - - - -  Procedimientos quirúrgicos para venas varicosas  Surgical Procedures for Varicose Veins  Las venas varicosas son venas hinchadas y retorcidas que se arnaldo debajo de la piel. Generalmente se encuentran en las piernas. Estas venas pueden verse azules y abultadas. Las várices son causadas por el daño en las válvulas de las venas. Todas las venas tienen tavo válvula que mantiene la malena circulando en tavo única dirección. Si tavo válvula se debilita o se daña, la malena puede acumularse y causar la formación de várices.    Es posible que deba someterse a tavo cirugía para tratar las venas varicosas si está experimentando determinados síntomas o complicaciones, o si los cambios en el estilo de christie no call sido de ayuda. Estos procedimientos pueden disminuir el riesgo de hemorragias y coágulos de malena, y aliviar el dolor y las molestias. También pueden mejorar el aspecto de la alon afectada (aspecto estético).    Los siguientes son dos procedimientos quirúrgicos frecuentes:  Flebectomía. La vena se extrae quirúrgicamente a través de tavo pequeña incisión. Ruthann procedimiento se utiliza para extraer las venas que están más cerca de la piel.  Ligadura venosa y varicectomía. La vena se extrae quirúrgicamente a través de incisiones después de gongora ligadura. Ruthann procedimiento se utiliza para tratar los casos graves.  El médico analizará el método más adecuado para usted en función de gongora estado general.    Informe al médico acerca de lo siguiente:  Cualquier alergia que tenga.  Todos los medicamentos que usa, incluidos vitaminas, hierbas, gotas oftálmicas, cremas y medicamentos de venta tammie.  Cualquier problema previo que usted o algún miembro de gongora lissette hayan tenido con los anestésicos.  Cualquier problema de la malena que tenga.  Cirugías a las que se haya sometido.  Cualquier afección médica que tenga.  Si está embarazada o podría estarlo.  ¿Cuáles son los riesgos?  En general, se trata de un procedimiento seguro. Sin embargo, pueden ocurrir complicaciones, por ejemplo:  Daño a las venas, los nervios o los tejidos cercanos. [place] puede provocar hinchazón en la pierna.  Irritación de la piel, llagas o manchas oscuras.  Formación de coágulos.  Infección.  Reacciones alérgicas a los medicamentos.  Necesidad de someterse a más tratamientos.  ¿Qué ocurre antes del procedimiento?  Medicamentos    Consulte al médico si debe hacer o no lo siguiente:  Cambiar o suspender los medicamentos que usa habitualmente. [place] es muy importante si kanwal medicamentos para la diabetes o anticoagulantes.  Jose medicamentos stephen aspirina e ibuprofeno. Estos medicamentos pueden tener un efecto anticoagulante en la malena. No tome estos medicamentos a menos que el médico se lo indique.  Usar medicamentos de venta tammie, vitaminas, hierbas y suplementos.  Estudios    Pueden hacerle un examen o pruebas. [place] puede incluir un estudio para lo siguiente:  Detectar la presencia de coágulos y controlar la circulación sanguínea mediante el uso de ondas de dina (ecografía Doppler).  Observar cómo circula la malena a través de las venas al inyectar un tinte que permite visualizar las venas en radiografías (angiograma). Ruthann estudio se utiliza en contadas ocasiones.  Instrucciones generales    Siga las instrucciones del médico respecto de las restricciones en las comidas o las bebidas.  Rosas que un adulto responsable lo lleve a gongora casa desde el hospital o la clínica.  Pregúntele al médico:  Cómo se marcará el lugar de la cirugía.  Qué medidas se tomarán para evitar tavo infección. Estas medidas pueden incluir las siguientes:  Rasurar el vello del lugar de la cirugía.  Cong la piel con un jabón antiséptico.  Recibir antibióticos.  ¿Qué ocurre jaxon el procedimiento?  Los pasos del procedimiento dependen del método que se utilice.    Flebectomía    Varicose veins in the lower legs with close-ups showing a hook being used to pull out the affected vein.  Le administrarán un medicamento para adormecer la alon (anestesia local).  Se hará tavo pequeña punción cerca de cada tavo de las venas varicosas.  Se utilizará un gancho diminuto para retirar la vena afectada.  Se utilizarán vendas (vendajes) o tiras adhesivas para cubrir el lugar de las punciones.  Ligadura venosa y varicectomía    Le administrarán un medicamento para hacerlo dormir (anestesia general).  Se hará tavo pequeña incisión cerca de la vena afectada de la johnny (vena safena).  El cirujano ligará la vena.  Luego se harán varias incisiones más a lo chelsea de la vena.  Se extirpará la vena.  Las incisiones se cerrarán con puntos (suturas) debajo de la piel.  Se utilizarán vendas o tiras adhesivas para cubrir el lugar de las incisiones.  El procedimiento puede variar según el médico y el hospital.    ¿Qué ocurre después del procedimiento?  Le controlarán la presión arterial, la frecuencia cardíaca, la frecuencia respiratoria y el nivel de oxígeno en la malena hasta que le den el gilberto del hospital o la clínica.  Quizás deba usar medias de compresión. Estas medias ayudan a evitar la formación de coágulos de malena y a reducir la hinchazón de las piernas.  Resumen  Las venas varicosas son venas hinchadas y retorcidas que se arnaldo debajo de la piel. Generalmente se encuentran en las piernas.  Es posible que deba someterse a tavo cirugía para tratar las venas varicosas si está experimentando determinados síntomas o complicaciones, o si los cambios en el estilo de christie no call sido de ayuda.  El médico analizará el método más adecuado para usted en función de gongora afección.  Esta información no tiene stephen fin reemplazar el consejo del médico. Asegúrese de hacerle al médico cualquier pregunta que tenga.

## 2024-04-09 NOTE — ED PROVIDER NOTE - NSICDXPASTMEDICALHX_GEN_ALL_CORE_FT
PAST MEDICAL HISTORY:  Diabetes      Burow's Advancement Flap Text: The defect edges were debeveled with a #15 scalpel blade.  Given the location of the defect and the proximity to free margins a Burow's advancement flap was deemed most appropriate.  Using a sterile surgical marker, the appropriate advancement flap was drawn incorporating the defect and placing the expected incisions within the relaxed skin tension lines where possible.    The area thus outlined was incised deep to adipose tissue with a #15 scalpel blade.  The skin margins were undermined to an appropriate distance in all directions utilizing iris scissors.

## 2024-04-09 NOTE — ED PROVIDER NOTE - CLINICAL SUMMARY MEDICAL DECISION MAKING FREE TEXT BOX
68 male with hx of chronic venous stasis, DM & prior TBI.   Pt presenting to the ED reporting bleeding from wound in the right lower extremity.  Patient reports that he had a bleeding varicose vein yesterday that is started bleeding again this morning.  Patient reports that after bending over to put pressure he got up quickly and felt lightheaded with a brief episode of syncope.   Patient caught by wife who witnessed the event.  No head/neck trauma.  No seizure activity.  No postictal period.  Patient currently back to baseline mental status.  Pressure dressing applied by EMS    Vitals stable.  Nontoxic appearing, n/v intact.  Airway intact, no respiratory distress, no hypoxia.  No abdominal or CVA tenderness.  Nonfocal neuro.  Bleeding varicose vein inspected with no bleeding at this time.    Plan to obtain:  -Labs (including repeat CBC to determine if significant bleeding occurred & second troponin), EKG, observe/reassess    ED Course:  Lab values demonstrate no acute/emergent pathology.  My independent interpretation of the EKG: Sinus @65, normal axis, normal intervals, normal ST/T  My independent interpretation of XR: [No consolidation/effusion/pntx]  Cardiac monitoring ordered due to episode of syncope and to monitor for dysrhythmia.   1) Cardiac monitor interpreted by me at [HH:MM]: Normal sinus rhythm @ [XX].   2) Cardiac monitor interpreted by me at [HH:MM]: Normal sinus rhythm @ [XX].    [***]    [Patient advised regarding need for close outpatient follow up.  Patient stable for further care in outpatient setting. No indication for inpatient admission at this time. Patient advised regarding symptomatic & supportive care and symptoms to prompt ED return. Strict return precautions provided.]    [Patient requires inpatient admission for further care & stabilization. Care signed out to inpatient team.] 68 male with hx of chronic venous stasis, DM & prior TBI.   Pt presenting to the ED reporting bleeding from wound in the right lower extremity.  Patient reports that he had a bleeding varicose vein yesterday that is started bleeding again this morning.  Patient reports that after bending over to put pressure he got up quickly and felt lightheaded with a brief episode of syncope.   Patient caught by wife who witnessed the event.  No head/neck trauma.  No seizure activity.  No postictal period.  Patient currently back to baseline mental status.  Pressure dressing applied by EMS    Vitals stable.  Nontoxic appearing, n/v intact.  Airway intact, no respiratory distress, no hypoxia.  No abdominal or CVA tenderness.  Nonfocal neuro.  Bleeding varicose vein inspected with no bleeding at this time.    Plan to obtain:  -Labs (including repeat CBC to determine if significant bleeding occurred & second troponin), EKG, observe/reassess    ED Course:  Lab values demonstrate no acute/emergent pathology. Trop neg x2. Hb w no drop requiring transfusion  My independent interpretation of the EKG: Sinus @65, normal axis, normal intervals, normal ST/T  My independent interpretation of XR: No consolidation/effusion/pntx  No events during ED observation  Low suspicion of cardiac etiology/dysrhythmia, aortic pathology, or DVT/PE.   Patient/Family advised regarding need for close outpatient follow up.  Patient stable for further care in outpatient setting. No indication for inpatient admission at this time. Patient/Family  advised regarding symptomatic & supportive care and symptoms to prompt ED return. Strict return precautions provided.

## 2024-04-09 NOTE — ED ADULT NURSE NOTE - OBJECTIVE STATEMENT
Pt presents to ED for RIght leg bleeding. As per wife, patient began bleeding from a vein on his lower right leg last night. States she put a wrap on it and was able to get the blood to stop. Today, wife states leg began bleeding again, notes large amount of blood loss. Wife also states patient became nauseous and passed out after seeing blood. Denies hitting head or LOC as she states she caught him. Wife states she put a wrap on leg. On assessment, MD removed wrap, no active bleeding noted from vein at this time. Labs to be drawn. Pt denies any complaints of pain.

## 2024-04-09 NOTE — ED PROVIDER NOTE - NSFOLLOWUPCLINICS_GEN_ALL_ED_FT
[facility]  Cardiology  [address], [address]  [place] [address]  Phone: [phone]  Fax:   Follow Up Time: 4-6 Days    [facility]  Internal Medicine  [address] [place] [address]  Phone: [phone]  Fax: [phone]  Follow Up Time: 1-3 Days    [facility]  Surgery  [address] [place] [address]  Phone: [phone]  Fax: [phone]  Follow Up Time: 4-6 Days

## 2024-04-09 NOTE — ED ADULT NURSE NOTE - NSFALLUNIVINTERV_ED_ALL_ED
Bed/Stretcher in lowest position, wheels locked, appropriate side rails in place/Call bell, personal items and telephone in reach/Instruct patient to call for assistance before getting out of bed/chair/stretcher/Non-slip footwear applied when patient is off stretcher/[place] to call system/Physically safe environment - no spills, clutter or unnecessary equipment/Purposeful proactive rounding/Room/bathroom lighting operational, light cord in reach

## 2024-04-09 NOTE — ED PROVIDER NOTE - PATIENT PORTAL LINK FT
You can access the FollowMyHealth Patient Portal offered by [facility] by registering at the following website: [email or web address]. By joining [employer]’s FollowMyHealth portal, you will also be able to view your health information using other applications (apps) compatible with our system.

## 2024-04-09 NOTE — ED PROVIDER NOTE - NS ED ROS FT
Constitutional: (-) fever (-) chills  HENT: (-) congestion (-) rhinorrhea (-) sore throat  Eyes: (-) pain (-) redness  Respiratory: (-) cough (-) shortness of breath (-) wheezing (-) stridor    Cardiovascular: (-) chest pain (-) palpitations (-) leg swelling (+) syncope  Gastrointestinal: (-) abdominal pain (-) blood in stool (no melena/hematochezia) (-) diarrhea (-) vomiting  Genitourinary: (-) dysuria (-) hematuria  Musculoskeletal: (-) gait problem (-) joint swelling (-) myalgias  Skin: (-) color change (-) rash (+) wound  Neurological: (-) weakness (-) numbness (-) headaches  Psychiatric/Behavioral: (-) confusion

## 2024-04-09 NOTE — ED PROVIDER NOTE - OBJECTIVE STATEMENT
68 male with hx of chronic venous stasis, DM & prior TBI.   Pt presenting to the ED reporting bleeding from wound in the right lower extremity.  Patient reports that he had a bleeding varicose vein yesterday that is started bleeding again this morning.  Patient reports that after bending over to put pressure he got up quickly and felt lightheaded with a brief episode of syncope.   Patient caught by wife who witnessed the event.  No head/neck trauma.  No seizure activity.  No postictal period.  Patient currently back to baseline mental status.  Pressure dressing applied by EMS.

## 2024-04-09 NOTE — ED PROVIDER NOTE - PHYSICAL EXAMINATION
Gen:  Awake, alert, NAD, WDWN, NCAT, non-toxic appearing. No skull/facial tender, no step-offs, no raccoon/gallardo.  Eyes:  PERRL, EOMI, no icterus, normal lids/lashes, normal conjunctivae.  ENT:  External inspection normal, pink/moist membranes. TM's & Canals normal b/l.   CV:  S1S2, regular rate and rhythm, no murmur/gallops/rubs, no JVD, 2+ pulses b/l, b/l LE edema w chronic venostasis changes, no cords/homans, warm/well-perfused.  Resp:  Normal respiratory rate/effort, no respiratory distress, normal voice, speaking full sentences, lungs clear to auscultation b/l, no wheezing/rales/rhonchi, no retractions, no stridor.  Abd:  Soft abdomen, no tender/distended/guarding/rebound, no pulsatile mass, no CVA tender.   Musculoskeletal:  N/V intact, FROM all 4 extremities, normal motor tone  Neck:  FROM neck, supple, trachea midline, no meningismus.   Skin:  Color normal for race, warm and dry, no rash. RLE just proximal to ankle area of dried blood at varicose vein w no active bleeding at this time; no surrounding erythema, induration, fluctuance, crepitus, purulent discharge, or lymphangitic streaking.  Neuro:  Oriented x3, CN 2-12 intact (grossly), normal motor (grossly), normal sensory (grossly), GCS 15  Psych:  Attention normal. Affect normal. Behavior normal. Judgment normal.